# Patient Record
Sex: FEMALE | Race: WHITE | NOT HISPANIC OR LATINO | Employment: UNEMPLOYED | URBAN - METROPOLITAN AREA
[De-identification: names, ages, dates, MRNs, and addresses within clinical notes are randomized per-mention and may not be internally consistent; named-entity substitution may affect disease eponyms.]

---

## 2017-03-09 ENCOUNTER — HISTORICAL (OUTPATIENT)
Dept: FAMILY MEDICINE | Facility: CLINIC | Age: 67
End: 2017-03-09

## 2017-07-10 ENCOUNTER — HISTORICAL (OUTPATIENT)
Dept: FAMILY MEDICINE | Facility: CLINIC | Age: 67
End: 2017-07-10

## 2017-08-18 ENCOUNTER — HISTORICAL (OUTPATIENT)
Dept: FAMILY MEDICINE | Facility: CLINIC | Age: 67
End: 2017-08-18

## 2018-04-18 ENCOUNTER — HISTORICAL (OUTPATIENT)
Dept: ADMINISTRATIVE | Facility: HOSPITAL | Age: 68
End: 2018-04-18

## 2018-04-18 ENCOUNTER — HISTORICAL (OUTPATIENT)
Dept: FAMILY MEDICINE | Facility: CLINIC | Age: 68
End: 2018-04-18

## 2018-04-18 LAB
ABS NEUT (OLG): 3.39 X10(3)/MCL (ref 2.1–9.2)
ALBUMIN SERPL-MCNC: 4.4 GM/DL (ref 3.4–5)
ALBUMIN/GLOB SERPL: 1 RATIO (ref 1–2)
ALP SERPL-CCNC: 62 UNIT/L (ref 45–117)
ALT SERPL-CCNC: 20 UNIT/L (ref 12–78)
AST SERPL-CCNC: 14 UNIT/L (ref 15–37)
BASOPHILS # BLD AUTO: 0.04 X10(3)/MCL
BASOPHILS NFR BLD AUTO: 1 %
BILIRUB SERPL-MCNC: 0.5 MG/DL (ref 0.2–1)
BILIRUBIN DIRECT+TOT PNL SERPL-MCNC: 0.1 MG/DL
BILIRUBIN DIRECT+TOT PNL SERPL-MCNC: 0.4 MG/DL
BUN SERPL-MCNC: 27 MG/DL (ref 7–18)
CALCIUM SERPL-MCNC: 8.6 MG/DL (ref 8.5–10.1)
CHLORIDE SERPL-SCNC: 105 MMOL/L (ref 98–107)
CHOLEST SERPL-MCNC: 192 MG/DL
CHOLEST/HDLC SERPL: 4.1 {RATIO} (ref 0–4.4)
CO2 SERPL-SCNC: 25 MMOL/L (ref 21–32)
CREAT SERPL-MCNC: 0.9 MG/DL (ref 0.6–1.3)
CREAT UR-MCNC: 66 MG/DL
DEPRECATED CALCIDIOL+CALCIFEROL SERPL-MC: 43.03 NG/ML (ref 30–80)
EOSINOPHIL # BLD AUTO: 0.18 X10(3)/MCL
EOSINOPHIL NFR BLD AUTO: 4 %
ERYTHROCYTE [DISTWIDTH] IN BLOOD BY AUTOMATED COUNT: 12.5 % (ref 11.5–14.5)
EST. AVERAGE GLUCOSE BLD GHB EST-MCNC: 183 MG/DL
GLOBULIN SER-MCNC: 3.1 GM/ML (ref 2.3–3.5)
GLUCOSE SERPL-MCNC: 135 MG/DL (ref 74–106)
HAV IGM SERPL QL IA: NONREACTIVE
HBA1C MFR BLD: 8 % (ref 4.2–6.3)
HBV CORE IGM SERPL QL IA: NONREACTIVE
HBV SURFACE AG SERPL QL IA: NEGATIVE
HCT VFR BLD AUTO: 37.8 % (ref 35–46)
HCV AB SERPL QL IA: REACTIVE
HDLC SERPL-MCNC: 47 MG/DL
HGB BLD-MCNC: 13 GM/DL (ref 12–16)
IMM GRANULOCYTES # BLD AUTO: 0.03 10*3/UL
IMM GRANULOCYTES NFR BLD AUTO: 1 %
LDLC SERPL CALC-MCNC: 127 MG/DL (ref 0–130)
LYMPHOCYTES # BLD AUTO: 1.29 X10(3)/MCL
LYMPHOCYTES NFR BLD AUTO: 25 % (ref 13–40)
MCH RBC QN AUTO: 31.8 PG (ref 26–34)
MCHC RBC AUTO-ENTMCNC: 34.4 GM/DL (ref 31–37)
MCV RBC AUTO: 92.4 FL (ref 80–100)
MICROALBUMIN UR-MCNC: <5 MG/L (ref 0–19)
MICROALBUMIN/CREAT RATIO PNL UR: <7.6 MCG/MG CR (ref 0–29)
MONOCYTES # BLD AUTO: 0.26 X10(3)/MCL
MONOCYTES NFR BLD AUTO: 5 % (ref 4–12)
NEUTROPHILS # BLD AUTO: 3.39 X10(3)/MCL
NEUTROPHILS NFR BLD AUTO: 65 X10(3)/MCL
PLATELET # BLD AUTO: 149 X10(3)/MCL (ref 130–400)
PMV BLD AUTO: 9.5 FL (ref 7.4–10.4)
POTASSIUM SERPL-SCNC: 4.6 MMOL/L (ref 3.5–5.1)
PROT SERPL-MCNC: 7.5 GM/DL (ref 6.4–8.2)
RBC # BLD AUTO: 4.09 X10(6)/MCL (ref 4–5.2)
SODIUM SERPL-SCNC: 138 MMOL/L (ref 136–145)
T4 SERPL-MCNC: 8.7 MCG/DL (ref 4.8–13.9)
TRIGL SERPL-MCNC: 88 MG/DL
TSH SERPL-ACNC: 1.48 MIU/L (ref 0.36–3.74)
VIT B12 SERPL-MCNC: 366 PG/ML (ref 193–986)
VLDLC SERPL CALC-MCNC: 18 MG/DL
WBC # SPEC AUTO: 5.2 X10(3)/MCL (ref 4.5–11)

## 2018-11-15 ENCOUNTER — HISTORICAL (OUTPATIENT)
Dept: ADMINISTRATIVE | Facility: HOSPITAL | Age: 68
End: 2018-11-15

## 2018-11-15 LAB
EST. AVERAGE GLUCOSE BLD GHB EST-MCNC: 151 MG/DL
HBA1C MFR BLD: 6.9 % (ref 4.2–6.3)

## 2019-01-11 ENCOUNTER — HISTORICAL (OUTPATIENT)
Dept: ADMINISTRATIVE | Facility: HOSPITAL | Age: 69
End: 2019-01-11

## 2019-01-11 LAB
ABS NEUT (OLG): 6.63 X10(3)/MCL (ref 2.1–9.2)
ALBUMIN SERPL-MCNC: 3.9 GM/DL (ref 3.4–5)
ALBUMIN/GLOB SERPL: 1 RATIO (ref 1–2)
ALP SERPL-CCNC: 94 UNIT/L (ref 45–117)
ALT SERPL-CCNC: 37 UNIT/L (ref 12–78)
AST SERPL-CCNC: 18 UNIT/L (ref 15–37)
BASOPHILS # BLD AUTO: 0.1 X10(3)/MCL
BASOPHILS NFR BLD AUTO: 1 %
BILIRUB SERPL-MCNC: 0.5 MG/DL (ref 0.2–1)
BILIRUBIN DIRECT+TOT PNL SERPL-MCNC: 0.2 MG/DL
BILIRUBIN DIRECT+TOT PNL SERPL-MCNC: 0.3 MG/DL
BUN SERPL-MCNC: 24 MG/DL (ref 7–18)
CALCIUM SERPL-MCNC: 9.4 MG/DL (ref 8.5–10.1)
CHLORIDE SERPL-SCNC: 93 MMOL/L (ref 98–107)
CO2 SERPL-SCNC: 26 MMOL/L (ref 21–32)
CREAT SERPL-MCNC: 1 MG/DL (ref 0.6–1.3)
EOSINOPHIL # BLD AUTO: 0.11 10*3/UL
EOSINOPHIL NFR BLD AUTO: 1 %
ERYTHROCYTE [DISTWIDTH] IN BLOOD BY AUTOMATED COUNT: 12 % (ref 11.5–14.5)
GLOBULIN SER-MCNC: 4.6 GM/ML (ref 2.3–3.5)
GLUCOSE SERPL-MCNC: 361 MG/DL (ref 74–106)
HCT VFR BLD AUTO: 41.5 % (ref 35–46)
HGB BLD-MCNC: 14 GM/DL (ref 12–16)
IMM GRANULOCYTES # BLD AUTO: 0.37 10*3/UL
IMM GRANULOCYTES NFR BLD AUTO: 4 %
LYMPHOCYTES # BLD AUTO: 1.08 X10(3)/MCL
LYMPHOCYTES NFR BLD AUTO: 12 % (ref 13–40)
MCH RBC QN AUTO: 31.3 PG (ref 26–34)
MCHC RBC AUTO-ENTMCNC: 33.7 GM/DL (ref 31–37)
MCV RBC AUTO: 92.8 FL (ref 80–100)
MONOCYTES # BLD AUTO: 0.63 X10(3)/MCL
MONOCYTES NFR BLD AUTO: 7 % (ref 4–12)
NEUTROPHILS # BLD AUTO: 6.63 X10(3)/MCL
NEUTROPHILS NFR BLD AUTO: 74 X10(3)/MCL
PLATELET # BLD AUTO: 249 X10(3)/MCL (ref 130–400)
PMV BLD AUTO: 9.5 FL (ref 7.4–10.4)
POTASSIUM SERPL-SCNC: 4.6 MMOL/L (ref 3.5–5.1)
PROT SERPL-MCNC: 8.5 GM/DL (ref 6.4–8.2)
RBC # BLD AUTO: 4.47 X10(6)/MCL (ref 4–5.2)
SODIUM SERPL-SCNC: 130 MMOL/L (ref 136–145)
WBC # SPEC AUTO: 8.9 X10(3)/MCL (ref 4.5–11)

## 2019-05-01 ENCOUNTER — HISTORICAL (OUTPATIENT)
Dept: ADMINISTRATIVE | Facility: HOSPITAL | Age: 69
End: 2019-05-01

## 2019-05-01 LAB
CHOLEST SERPL-MCNC: 187 MG/DL
CHOLEST/HDLC SERPL: 3.5 {RATIO} (ref 0–4.4)
CREAT UR-MCNC: 37 MG/DL
EST. AVERAGE GLUCOSE BLD GHB EST-MCNC: 177 MG/DL
HBA1C MFR BLD: 7.8 % (ref 4.2–6.3)
HDLC SERPL-MCNC: 53 MG/DL
LDLC SERPL CALC-MCNC: 115 MG/DL (ref 0–130)
MICROALBUMIN UR-MCNC: <5 MG/L (ref 0–19)
MICROALBUMIN/CREAT RATIO PNL UR: <13.5 MCG/MG CR (ref 0–29)
T4 FREE SERPL-MCNC: 0.92 NG/DL (ref 0.76–1.46)
TRIGL SERPL-MCNC: 93 MG/DL
TSH SERPL-ACNC: 2.74 MIU/L (ref 0.36–3.74)
VIT B12 SERPL-MCNC: 465 PG/ML (ref 193–986)
VLDLC SERPL CALC-MCNC: 19 MG/DL

## 2019-07-16 ENCOUNTER — HISTORICAL (OUTPATIENT)
Dept: RADIOLOGY | Facility: HOSPITAL | Age: 69
End: 2019-07-16

## 2020-01-21 ENCOUNTER — HISTORICAL (OUTPATIENT)
Dept: FAMILY MEDICINE | Facility: CLINIC | Age: 70
End: 2020-01-21

## 2020-03-06 ENCOUNTER — HISTORICAL (OUTPATIENT)
Dept: ADMINISTRATIVE | Facility: HOSPITAL | Age: 70
End: 2020-03-06

## 2020-03-06 LAB
BUN SERPL-MCNC: 13 MG/DL (ref 7–18)
CALCIUM SERPL-MCNC: 8.7 MG/DL (ref 8.5–10.1)
CHLORIDE SERPL-SCNC: 100 MMOL/L (ref 98–107)
CO2 SERPL-SCNC: 25 MMOL/L (ref 21–32)
CREAT SERPL-MCNC: 0.6 MG/DL (ref 0.6–1.3)
CREAT/UREA NIT SERPL: 22
EST. AVERAGE GLUCOSE BLD GHB EST-MCNC: 220 MG/DL
GLUCOSE SERPL-MCNC: 247 MG/DL (ref 74–106)
HBA1C MFR BLD: 9.3 % (ref 4.2–6.3)
POTASSIUM SERPL-SCNC: 3.7 MMOL/L (ref 3.5–5.1)
SODIUM SERPL-SCNC: 132 MMOL/L (ref 136–145)
VIT B12 SERPL-MCNC: 485 PG/ML (ref 193–986)

## 2020-06-15 ENCOUNTER — HISTORICAL (OUTPATIENT)
Dept: RADIOLOGY | Facility: HOSPITAL | Age: 70
End: 2020-06-15

## 2020-09-23 ENCOUNTER — HISTORICAL (OUTPATIENT)
Dept: CARDIOLOGY | Facility: HOSPITAL | Age: 70
End: 2020-09-23

## 2020-10-02 ENCOUNTER — HISTORICAL (OUTPATIENT)
Dept: RADIOLOGY | Facility: HOSPITAL | Age: 70
End: 2020-10-02

## 2020-10-27 ENCOUNTER — HISTORICAL (OUTPATIENT)
Dept: ADMINISTRATIVE | Facility: HOSPITAL | Age: 70
End: 2020-10-27

## 2020-10-27 LAB
BUN SERPL-MCNC: 18 MG/DL (ref 9.8–20.1)
CALCIUM SERPL-MCNC: 9.2 MG/DL (ref 8.4–10.2)
CHLORIDE SERPL-SCNC: 102 MMOL/L (ref 98–107)
CHOLEST SERPL-MCNC: 171 MG/DL
CHOLEST/HDLC SERPL: 3 {RATIO} (ref 0–5)
CO2 SERPL-SCNC: 25 MMOL/L (ref 23–31)
CREAT SERPL-MCNC: 0.84 MG/DL (ref 0.55–1.02)
CREAT/UREA NIT SERPL: 21
DEPRECATED CALCIDIOL+CALCIFEROL SERPL-MC: 19.6 NG/ML (ref 30–80)
EST. AVERAGE GLUCOSE BLD GHB EST-MCNC: 157.1 MG/DL
GLUCOSE SERPL-MCNC: 201 MG/DL (ref 82–115)
HBA1C MFR BLD: 7.1 %
HDLC SERPL-MCNC: 52 MG/DL (ref 35–60)
LDLC SERPL CALC-MCNC: 96 MG/DL (ref 50–140)
POTASSIUM SERPL-SCNC: 3.9 MMOL/L (ref 3.5–5.1)
SODIUM SERPL-SCNC: 136 MMOL/L (ref 136–145)
TRIGL SERPL-MCNC: 115 MG/DL (ref 37–140)
VLDLC SERPL CALC-MCNC: 23 MG/DL

## 2020-12-14 ENCOUNTER — HISTORICAL (OUTPATIENT)
Dept: ADMINISTRATIVE | Facility: HOSPITAL | Age: 70
End: 2020-12-14

## 2020-12-14 LAB
ABS NEUT (OLG): 3.42 X10(3)/MCL (ref 2.1–9.2)
BASOPHILS # BLD AUTO: 0 X10(3)/MCL (ref 0–0.2)
BASOPHILS NFR BLD AUTO: 1 %
EOSINOPHIL # BLD AUTO: 0.2 X10(3)/MCL (ref 0–0.9)
EOSINOPHIL NFR BLD AUTO: 3 %
ERYTHROCYTE [DISTWIDTH] IN BLOOD BY AUTOMATED COUNT: 17.2 % (ref 11.5–14.5)
HCT VFR BLD AUTO: 37.2 % (ref 35–46)
HGB BLD-MCNC: 11.7 GM/DL (ref 12–16)
IMM GRANULOCYTES # BLD AUTO: 0.01 10*3/UL
IMM GRANULOCYTES NFR BLD AUTO: 0 %
LYMPHOCYTES # BLD AUTO: 1 X10(3)/MCL (ref 0.6–4.6)
LYMPHOCYTES NFR BLD AUTO: 20 %
MCH RBC QN AUTO: 27.3 PG (ref 26–34)
MCHC RBC AUTO-ENTMCNC: 31.5 GM/DL (ref 31–37)
MCV RBC AUTO: 86.9 FL (ref 80–100)
MONOCYTES # BLD AUTO: 0.3 X10(3)/MCL (ref 0.1–1.3)
MONOCYTES NFR BLD AUTO: 6 %
NEUTROPHILS # BLD AUTO: 3.42 X10(3)/MCL (ref 2.1–9.2)
NEUTROPHILS NFR BLD AUTO: 69 %
PLATELET # BLD AUTO: 201 X10(3)/MCL (ref 130–400)
PMV BLD AUTO: 9.6 FL (ref 7.4–10.4)
RBC # BLD AUTO: 4.28 X10(6)/MCL (ref 4–5.2)
T3FREE SERPL-MCNC: 2.49 PG/ML (ref 1.71–3.71)
T4 FREE SERPL-MCNC: 1.1 NG/DL (ref 0.7–1.48)
TSH SERPL-ACNC: 1.03 UIU/ML (ref 0.35–4.94)
WBC # SPEC AUTO: 4.9 X10(3)/MCL (ref 4.5–11)

## 2021-01-22 ENCOUNTER — HISTORICAL (OUTPATIENT)
Dept: ADMINISTRATIVE | Facility: HOSPITAL | Age: 71
End: 2021-01-22

## 2021-01-22 LAB — SARS-COV-2 RNA RESP QL NAA+PROBE: NOT DETECTED

## 2021-05-02 LAB
LEFT EYE DM RETINOPATHY: NEGATIVE
RIGHT EYE DM RETINOPATHY: NEGATIVE

## 2021-06-29 ENCOUNTER — HISTORICAL (OUTPATIENT)
Dept: ADMINISTRATIVE | Facility: HOSPITAL | Age: 71
End: 2021-06-29

## 2021-06-29 LAB
CREAT UR-MCNC: 147.9 MG/DL (ref 45–106)
CREAT UR-MCNC: 147.9 MG/DL (ref 45–106)
EST. AVERAGE GLUCOSE BLD GHB EST-MCNC: 108.3 MG/DL
HBA1C MFR BLD: 5.4 %
MICROALBUMIN UR-MCNC: 5.3 MG/L
MICROALBUMIN/CREAT RATIO PNL UR: 3.6 MG/GM CR (ref 0–30)
PROT UR STRIP-MCNC: 9.1 MG/DL
PROT/CREAT UR-RTO: 61.5 MG/GM CR
T4 FREE SERPL-MCNC: 1.04 NG/DL (ref 0.7–1.48)
TSH SERPL-ACNC: 0.21 UIU/ML (ref 0.35–4.94)

## 2021-12-16 ENCOUNTER — HISTORICAL (OUTPATIENT)
Dept: ADMINISTRATIVE | Facility: HOSPITAL | Age: 71
End: 2021-12-16

## 2021-12-16 LAB
ABS NEUT (OLG): 4.45 X10(3)/MCL (ref 2.1–9.2)
ALBUMIN SERPL-MCNC: 4.4 GM/DL (ref 3.4–4.8)
ALBUMIN/GLOB SERPL: 1.5 RATIO (ref 1.1–2)
ALP SERPL-CCNC: 73 UNIT/L (ref 40–150)
ALT SERPL-CCNC: 13 UNIT/L (ref 0–55)
AST SERPL-CCNC: 16 UNIT/L (ref 5–34)
BASOPHILS # BLD AUTO: 0 X10(3)/MCL (ref 0–0.2)
BASOPHILS NFR BLD AUTO: 1 %
BILIRUB SERPL-MCNC: 0.3 MG/DL
BILIRUBIN DIRECT+TOT PNL SERPL-MCNC: 0.1 MG/DL (ref 0–0.8)
BILIRUBIN DIRECT+TOT PNL SERPL-MCNC: 0.2 MG/DL (ref 0–0.5)
BUN SERPL-MCNC: 18 MG/DL (ref 9.8–20.1)
CALCIUM SERPL-MCNC: 9.8 MG/DL (ref 8.7–10.5)
CHLORIDE SERPL-SCNC: 106 MMOL/L (ref 98–107)
CO2 SERPL-SCNC: 19 MMOL/L (ref 23–31)
CREAT SERPL-MCNC: 0.8 MG/DL (ref 0.55–1.02)
DEPRECATED CALCIDIOL+CALCIFEROL SERPL-MC: 40.4 NG/ML (ref 30–80)
EOSINOPHIL # BLD AUTO: 0.1 X10(3)/MCL (ref 0–0.9)
EOSINOPHIL NFR BLD AUTO: 1 %
ERYTHROCYTE [DISTWIDTH] IN BLOOD BY AUTOMATED COUNT: 15.4 % (ref 11.5–14.5)
EST. AVERAGE GLUCOSE BLD GHB EST-MCNC: 168.6 MG/DL
GLOBULIN SER-MCNC: 2.9 GM/DL (ref 2.4–3.5)
GLUCOSE SERPL-MCNC: 148 MG/DL (ref 82–115)
HBA1C MFR BLD: 7.5 %
HCT VFR BLD AUTO: 34.9 % (ref 35–46)
HGB BLD-MCNC: 11 GM/DL (ref 12–16)
IMM GRANULOCYTES # BLD AUTO: 0.02 10*3/UL
IMM GRANULOCYTES NFR BLD AUTO: 0 %
LYMPHOCYTES # BLD AUTO: 0.8 X10(3)/MCL (ref 0.6–4.6)
LYMPHOCYTES NFR BLD AUTO: 15 %
MCH RBC QN AUTO: 27 PG (ref 26–34)
MCHC RBC AUTO-ENTMCNC: 31.5 GM/DL (ref 31–37)
MCV RBC AUTO: 85.7 FL (ref 80–100)
MONOCYTES # BLD AUTO: 0.3 X10(3)/MCL (ref 0.1–1.3)
MONOCYTES NFR BLD AUTO: 5 %
NEUTROPHILS # BLD AUTO: 4.45 X10(3)/MCL (ref 2.1–9.2)
NEUTROPHILS NFR BLD AUTO: 78 %
NRBC BLD AUTO-RTO: 0 % (ref 0–0.2)
PLATELET # BLD AUTO: 177 X10(3)/MCL (ref 130–400)
PMV BLD AUTO: 10.3 FL (ref 7.4–10.4)
POTASSIUM SERPL-SCNC: 3.9 MMOL/L (ref 3.5–5.1)
PROT SERPL-MCNC: 7.3 GM/DL (ref 5.8–7.6)
RBC # BLD AUTO: 4.07 X10(6)/MCL (ref 4–5.2)
SODIUM SERPL-SCNC: 139 MMOL/L (ref 136–145)
T4 FREE SERPL-MCNC: 1.22 NG/DL (ref 0.7–1.48)
TSH SERPL-ACNC: 0.68 UIU/ML (ref 0.35–4.94)
VIT B12 SERPL-MCNC: 494 PG/ML (ref 213–816)
WBC # SPEC AUTO: 5.7 X10(3)/MCL (ref 4.5–11)

## 2022-02-04 ENCOUNTER — HISTORICAL (OUTPATIENT)
Dept: RADIOLOGY | Facility: HOSPITAL | Age: 72
End: 2022-02-04

## 2022-02-04 ENCOUNTER — HISTORICAL (OUTPATIENT)
Dept: ADMINISTRATIVE | Facility: HOSPITAL | Age: 72
End: 2022-02-04

## 2022-04-10 ENCOUNTER — HISTORICAL (OUTPATIENT)
Dept: ADMINISTRATIVE | Facility: HOSPITAL | Age: 72
End: 2022-04-10
Payer: COMMERCIAL

## 2022-04-11 ENCOUNTER — HISTORICAL (OUTPATIENT)
Dept: ADMINISTRATIVE | Facility: HOSPITAL | Age: 72
End: 2022-04-11
Payer: COMMERCIAL

## 2022-04-27 ENCOUNTER — HISTORICAL (OUTPATIENT)
Dept: ADMINISTRATIVE | Facility: HOSPITAL | Age: 72
End: 2022-04-27
Payer: COMMERCIAL

## 2022-04-28 VITALS
DIASTOLIC BLOOD PRESSURE: 75 MMHG | SYSTOLIC BLOOD PRESSURE: 110 MMHG | WEIGHT: 129.19 LBS | OXYGEN SATURATION: 97 % | HEIGHT: 66 IN | BODY MASS INDEX: 20.76 KG/M2

## 2022-04-28 VITALS
BODY MASS INDEX: 20.76 KG/M2 | SYSTOLIC BLOOD PRESSURE: 110 MMHG | DIASTOLIC BLOOD PRESSURE: 75 MMHG | WEIGHT: 129.19 LBS | OXYGEN SATURATION: 97 % | HEIGHT: 66 IN

## 2022-04-28 DIAGNOSIS — G20.C PARKINSONISM, UNSPECIFIED PARKINSONISM TYPE: Primary | ICD-10-CM

## 2022-05-03 NOTE — HISTORICAL [facility] CERNER
This is a historical note converted from [employer]. Formatting and pictures may have been removed.  Please reference [employer] for original formatting and attached multimedia. Chief Complaint  HERE FOR GERIATRIC ASSESSMENT  PT HAD FALL 1 WEEK AGO C/O PAIN TO LEFT RIB CAGE AREA  C/O WEAKNESS AND UNSTEADY GAIT  History of Present Illness  Ms. Brenda Hernández is a 71 year old female with a past medical history of type 2 diabetes mellitus, hypothyroidism, hyperlipidemia, and anxiety/depression.? She presents to geriatrics assessment clinic for initial visit.? She was referred by her PCP Dr. Hardy for memory loss, depression, and functional decline.? Per nursing report, she is very unsteady on her feet and had a fall just last week.? She expresses sadness and understands that her situation is not ideal, is requesting assistance 2-3 times a week with bathing, food preparation, and cleaning.?  ?  Geriatrics Assessment  Functional Capacity:  BADLs: difficulty with bathing. No shower chair.? incontinent, wears diapers  IADLs: goes shopping with friends, does not drive, as far as preparing meals, she eats mostly microwavable foods or junk foods, is waiting for meals on wheels  Fall Risk: has fallen 4 times in the past 6 weeks, loses her footing.? Unsteady gait, no visual or hearing loss.? Lives on the second floor of her subsidized housing  Cognition: intact, SLUMS of 27  Mood: depressed  Polypharmacy: takes ativan 1.5mg TID, ingrezza 40mg daily, seroquel 200mg at night for insomnia, has a history of alcohol abuse and marijuana use  Social support: poor, no family in the area, has some friends that help her around that are older than her, in their 80s  Financial concerns: indigent  Goals of care: wants assistance at home, has a cat that she is adamant about not abandoning, limiting her placement in an assisted care living facility  Advance care preferences: is the oldest of 3 siblings, has a younger brother and sister  Code status:  DNR/DNI  Review of Systems  12 point ROS negative unless otherwise stated above  Physical Exam  Vitals & Measurements  T:?37.0? ?C (Oral)? HR:?96(Peripheral)? RR:?20? BP:?127/78?  ?  Gen: She is alert and oriented x3. Well developed, well-nourished, frail appearing elderly  female  Head: Normocephalic, atraumatic, black eye on R side secondary to fall  Eyes: PERRL, EOMI, no conjunctival injection or pallor, periorbital ecchymosis of R eye  Nose: No nasal discharge  Throat: No pharyngeal inflammation, erythema, discharge, mucous membranes moist  Neck: Supple. No carotid bruits.? No lymphadenopathy or thyromegaly.  Lungs: Clear to auscultation bilaterally  CV: Normal S1 & S2, RRR, no murmurs appreciated  Abdomen: Soft, NT/ND, bowel sounds present.? No hepatosplenomegaly  Ext: No cyanosis/clubbing/edema, pulses 2+  Neuro:?CN II-XII grossly intact, generalized weakness, unsteady gait, resting tremor of bilateral upper extremities more prominent on L side  Psych: Flat affect, expresses depressive thoughts but denies SI/HI  Skin: No rashes, lesions, ulceration or induration  Assessment/Plan  Geriatric Assessment  Will defer to Dr. Hardy for management of Primary care services  Rescheduled appointment from 1/4 to 1/13 due to multiple appointments in a day  management of tardive dyskinesia per Dr. Alexander  Would recommend deprescribing of lorazepam, oxybutynin, ingrezza, and seroquel if patient is agreeable  Will repeat labs today, CBC, CMP, TSH, FT4, A1c, vitamin D, vitamin B12  Patient would benefit from round the clock care in the setting of an acute care facility but has complex social situation  ?   RTC in [facility]  ?   ????I saw and evaluated the patient. I discussed with the resident and agree with the residents findings and plan as documented in the residents note.  ?   Ant Macias MD  internal medicine [id number]   Problem List/Past Medical History  Ongoing  Bipolar  disorder  Depression  Fibromyalgia  Frailty  NEWTON (generalized anxiety disorder)  GERD without esophagitis  h/o Hep C, took tx, was cured  Hypercholesterolemia  Hypertension  Hypothyroidism  Migraines  Neuroleptic induced parkinsonism  Neuroleptic-induced tardive dyskinesia  Neuropathy  Restless legs syndrome  Tardive dyskinesia  Tremor  Type 2 diabetes mellitus  Urinary incontinence  Historical  Coughing  left dequervains  Wears glasses  Procedure/Surgical History  Dequervains (Left) (05/23/2014)  Exploration of tendon sheath of hand (05/23/2014)  Incision, extensor tendon sheath, wrist (eg, deQuervains disease). (05/23/2014)  Biopsy of liver (1990)  Tonsillectomy (1956)  removal IUD   Medications  chlorhexidine 0.12% mucous membrane liquid, 15 mL, Oral, BID  citalopram 10 mg oral tablet, 10 mg= 1 tab(s), Oral, Daily  Glucometer, See Instructions  Glucometer Lancets & Testing Strips, See Instructions, 3 refills  Ingrezza 40 mg oral capsule, 40 mg= 1 cap(s), Oral, Daily, 4 refills  levothyroxine 50 mcg (0.05 mg) oral tablet, 0.05 mg= 1 tab(s), Oral, Daily-Resp, 5 refills  lisinopril 2.5 mg oral tablet, 2.5 mg= 1 tab(s), Oral, Daily, 1 refills  LORazepam 0.5 mg oral tablet, 0.5 mg= 1 tab(s), Oral, TID, PRN  LORazepam 1 mg oral tablet, 1 mg= 1 tab(s), Oral, TID  metFORMIN 1000 mg oral tablet, See Instructions, 5 refills,? ?Not taking  oxybutynin 5 mg oral tablet, 5 mg= 1 tab(s), Oral, TID, 5 refills  pair of diabetic shoes and 3 pairs of insoles, See Instructions  Pantoprazole 40 mg ORAL EC-Tablet, 40 mg= 1 tab(s), Oral, Daily, 3 refills  Pressure Relieving Seat Cushion, See Instructions  QUEtiapine 200 mg oral tablet, 200 mg= 1 tab(s), Oral, qPM,? ?Not taking  Shingrix, 0.5 mL, IM, Once-NOW  simvastatin 20 mg oral tablet, 20 mg= 1 tab(s), Oral, Once a day (at bedtime), 3 refills  Sure Comfort Pen Needles, See Instructions, 11 refills  tiZANidine 4 mg oral tablet, 4 mg= 1 tab(s), Oral, q8hr Resp  Verio testing strips,  See Instructions, 11 refills  Allergies  Benadryl  Vistaril  amitriptyline  aspirin?(cannot take d/t previous interferon treatment)  dextromethorphan  Social History  Abuse/Neglect  No, No, Yes, 07/08/2021  No, No, Yes, 06/02/2021  No, No, Yes, 04/30/2021  No, No, Yes, 04/06/2021  No, No, Yes, 10/27/2020  Alcohol  Past, 12/14/2020  Employment/School  Retired, 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  Depression.: Sister.  Diabetes mellitus type 2: Sister and Brother.  Heart disease: Grandfather.  Hypertension.: Sister and Brother.  Stroke: Grandfather.  Immunizations  Vaccine Date Status   zoster vaccine, inactivated 08/02/2021 Given   pneumococcal 23-polyvalent vaccine 04/28/2021 Given   zoster vaccine, inactivated 04/28/2021 Given   COVID-19 MRNA, LNP-S, PF- Pfizer 03/18/2021 Given   COVID-19 MRNA, LNP-S, PF- Pfizer 02/25/2021 Given   influenza virus vaccine, inactivated 10/27/2020 Given   influenza virus vaccine, inactivated 01/17/2020 Given   influenza virus vaccine, inactivated 11/15/2018 Given   influenza virus vaccine, inactivated 01/30/2018 Given   pneumococcal 13-valent conjugate vaccine 04/17/2017 Given   tetanus/diphtheria/pertussis, acel(Tdap) 02/20/2017 Given   Health Maintenance  Health Maintenance  ???Pending?(in the next year)  ??? ??OverDue  ??? ? ? ?Advance Directive due??01/02/21??and every 1??year(s)  ??? ? ? ?Colorectal Screening due??01/18/21??and every 1??year(s)  ??? ? ? ?Bone Density Screening due??07/16/21??and every 2??year(s)  ??? ? ? ?Diabetes Maintenance-Fasting Lipid Profile due??10/27/21??and  every 1??year(s)  ??? ??Due?  ??? ? ? ?Medicare Annual Wellness Exam due??12/16/21??and every 1??year(s)  ??? ??Due In Future?  ??? ? ? ?Obesity Screening not due until??01/01/22??and every 1??year(s)  ??? ? ? ?Alcohol Misuse Screening not due until??01/02/22??and every 1??year(s)  ??? ? ? ?Cognitive Screening not due until??01/02/22??and every 1??year(s)  ??? ? ? ?Fall Risk Assessment not due until??01/02/22??and every 1??year(s)  ??? ? ? ?Functional Assessment not due until??01/02/22??and every 1??year(s)  ??? ? ? ?Diabetes Maintenance-Eye Exam not due until??04/28/22??and every 1??year(s)  ??? ? ? ?Hypertension Management-BMP not due until??04/30/22??and every 1??year(s)  ??? ? ? ?Diabetes Maintenance-Serum Creatinine not due until??04/30/22??and every 1??year(s)  ??? ? ? ?Hypertension Management-Education not due until??06/02/22??and every 1??year(s)  ??? ? ? ?Diabetes Maintenance-Foot Exam not due until??06/29/22??and every 1??year(s)  ??? ? ? ?ADL Screening not due until??07/08/22??and every 1??year(s)  ??? ? ? ?Breast Cancer Screening not due until??10/02/22??and every 2??year(s)  ??? ? ? ?Blood Pressure Screening not due until??10/28/22??and every 1??year(s)  ??? ? ? ?Body Mass Index Check not due until??10/28/22??and every 1??year(s)  ??? ? ? ?Diabetes Maintenance-HgbA1c not due until??10/28/22??and every 1??year(s)  ??? ? ? ?Hypertension Management-Blood Pressure not due until??10/28/22??and every 1??year(s)  ???Satisfied?(in the past 1 year)  ??? ??Satisfied?  ??? ? ? ?ADL Screening on??07/08/21.??Satisfied by Raquel Christina LPN  ??? ? ? ?Alcohol Misuse Screening on??04/28/21.??Satisfied by Katt Pizarro LPN  ??? ? ? ?Blood Pressure Screening on??12/16/21.??Satisfied by Eugenie Jimenez LPN  ??? ? ? ?Body Mass Index Check on??10/28/21.??Satisfied by Katt Pizarro LPN  ??? ? ? ?Cognitive Screening on??12/16/21.??Satisfied by Eugenie Jimenez LPN  ??? ? ? ?Depression Screening on??12/16/21.??Satisfied by  Eugenie Jimenez LPN  ??? ? ? ?Diabetes Maintenance-HgbA1c on??10/28/21.??Satisfied by Katt Pizarro LPN  ??? ? ? ?Diabetes Screening on??06/29/21.??Satisfied by STORMY SPANN  ??? ? ? ?Fall Risk Assessment on??12/16/21.??Satisfied by Eugenie Jimenez LPN  ??? ? ? ?Functional Assessment on??12/16/21.??Satisfied by Euegnie Jimenez LPN  ??? ? ? ?Hypertension Management-Blood Pressure on??12/16/21.??Satisfied by Eugenie Jimenez LPN  ??? ? ? ?Obesity Screening on??10/28/21.??Satisfied by Katt Pizarro LPN  ??? ? ? ?Pneumococcal Vaccine on??04/28/21.??Satisfied by Katt Pizarro LPN  ??? ? ? ?Zoster Vaccine on??08/02/21.??Satisfied by Real Dowell LPN  ?

## 2022-05-03 NOTE — HISTORICAL [facility] CERNER
This is a historical note converted from Ac. Formatting and pictures may have been removed.  Please reference Ac for original formatting and attached multimedia. Chief Complaint  f/u 3 month , htn / dm , , need meds refill .  History of Present Illness  68 yo WF  ?  Bipolar?depression-?On 2/18/2020 presented to [facility] ER via EMS.? She owes her marijuana dealer $200 and was paranoid that he was trying to break into her house.? ER physician did believe that she could have a drug dealer banging on her door but given her inconsistencies in her story and her psychiatric history she was PECd and sent to a psychiatric facility in [place].? She had a 5-day stay in the psychiatric hospital and was very displeased with her treatment there.? They weaned her off her chronic clonazepam.? Placed on Ambien to replace the clonazepam to sleep.? She saw psychiatrist Dr. Terrie Alexander 3/5/20 for psychiatric follow-up.? Changes to psychiatric medicine clonazepam and Latuda have been discontinued.? Ambien was discontinued on 3/5/2020 and Sertraline 100 mg was continued.? Geodon 40 mg twice daily was added.? Current psychiatric regimen sertraline 100 mg daily, ziprasidone 40 mg twice daily.? She sees counselor [facility] at [facility].? She suffers from akathisia?given her chronic?antipsychotic use.  ?  Chronic lumbar back pain-bilateral radicular?symptoms.? Referral?to?Dr. Zaman for LESI placed in 1/17/20 but given?psychiatric episode she has not?arranged?appointment. ?She did obtain back brace after last visit and reports significant improvement in pain.? She is still taking gabapentin 800 mg?3 times daily for the radicular pain.  ?  DM-Metformin 1000 mg twice daily?A1c 7.8 5/2019  ?  Hypothyroidism-TSH 2.93 on 2/18/2020, currently on levothyroxine?50 mcg daily.  ?  Hypokalemia?- potassium 3.1 on 2/18/2020 in ER.? Has been taking potassium since psychiatric discharge.? Denies muscle cramps  Review of  Systems  Constitutional:?Reports weight loss given recent psychiatric illness. 13 pound 2-month weight loss.? When patient was told she is 143 pounds she reported that?this was?a good weight for her  GI: No constipation or diarrhea  Psychiatric: See HPI?  Neurological:?Reports?bilateral tremors?chronically  Physical Exam  Vitals & Measurements  T:?37.5? ?C (Oral)? HR:?91(Peripheral)? RR:?20? BP:?130/79? SpO2:?100%?  HT:?167?cm? WT:?65.10?kg? BMI:?23.34?  General: no acute distress, thin  Eye: no scleral icterus  HENT: MMM, oropharynx without erythema/exudate, upper teeth all missing  Neck: supple, no lymphadenopathy, no carotid bruits  Respiratory: clear to auscultation bilaterally, nonlabored respirations  Cardiovascular: regular rate and rhythm without murmurs or?gallops, no edema  Gastrointestinal:?soft, non-tender, non-distended  Genitourinary: no suprapubic tenderness  Musculoskeletal: no gross deformities  Integumentary: no acute rashes or skin lesions present  Neurological: AAOx4, BUE and BLE resting?tremor?that does not resolve with?movement, CN III-XII, normal finger to nose bilaterally, normal rapid alternating hand movements, negative Romberg, no gait abnormality, 5/5 BUE and BLE  Psychiatric: No SI/HI, paranoia but no tangential thoughts, no racing thoughts  Assessment/Plan  1.?Low back pain?M54.5  Continue gabapentin,?continue tizanidine?4 mg as needed  Advised to continue back brace  Advised to call Dr. Zaman office for follow-up  2.?Bipolar disorder?F31.9  Continue follow-up with Dr. Alexander  Continue ziprasidone 40 mg twice daily,?sertraline 100 mg?qhs  3.?Hypokalemia?E87.6  Diabetes mellitus?E11.9  ?A1c today 9.3  Continue metformin 1000 mg twice daily?and?lisinopril 5 mg daily. Repeat A1C at 3 month f/u, will not increase meds given recent psychiatric illness  ?  RTC 3 months or sooner if needed  Referrals  Clinic Follow up, *Est. 06/02/20 12:40:00 CDT, Order for future visit, Low back pain, [place]  [facility]   Problem List/Past Medical History  Ongoing  Bipolar disorder  Depression  Diabetes mellitus  Fibromyalgia  h/o Hep C, took tx, was cured  Hypercholesterolemia  Hypertension  Hypothyroidism  Low back pain  Migraines  Restless legs syndrome  Historical  Coughing  left dequervains  Wears glasses  Procedure/Surgical History  Dequervains (Left) (05/23/2014)  Exploration of tendon sheath of hand (05/23/2014)  Incision, extensor tendon sheath, wrist (eg, deQuervains disease). (05/23/2014)  Biopsy of liver (1990)  Tonsillectomy (1956)  removal IUD   Medications  gabapentin 400 mg oral capsule, 800 mg= 2 cap(s), Oral, TID Resp, 1 refills  levothyroxine 50 mcg (0.05 mg) oral tablet, 0.05 mg= 1 tab(s), Oral, Daily-Resp, 5 refills  lisinopril 5 mg oral tablet, 5 mg= 1 tab(s), Oral, Daily, 5 refills  lovastatin 20 mg oral tablet, 20 mg= 1 tab(s), Oral, Daily, 5 refills  metFORMIN 1000 mg oral tablet, 1000 mg= 1 tab(s), Oral, BID, 5 refills  oxybutynin 5 mg oral tablet, 5 mg= 1 tab(s), Oral, BID-Resp, 5 refills  sertraline 50 mg oral tablet, 100 mg= 2 tab(s), Oral, QHS Resp  tiZANidine 4 mg oral tablet, 4 mg= 1 tab(s), Oral, q8hr Resp  ziprasidone 40 mg oral capsule, 40 mg= 1 cap(s), Oral, BID  Allergies  Benadryl  Vistaril  amitriptyline  aspirin?(cannot take d/t previous interferon treatment)  dextromethorphan  Social History  Abuse/Neglect  No, No, Yes, 03/06/2020  No, No, Yes, 02/19/2020  No, No, Yes, 01/17/2020  Alcohol  Current, 11/08/2015  Employment/School  Retired, 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  Depression.:  Sister.  Diabetes mellitus type 2: Sister and Brother.  Heart disease: Grandfather.  Hypertension.: Sister and Brother.  Stroke: Grandfather.  Immunizations  Vaccine Date Status   influenza virus vaccine, inactivated 01/17/2020 Given   influenza virus vaccine, inactivated 11/15/2018 Given   influenza virus vaccine, inactivated 01/30/2018 Given   pneumococcal 13-valent conjugate vaccine 04/17/2017 Given   tetanus/diphtheria/pertussis, acel(Tdap) 02/20/2017 Given   Health Maintenance  Health Maintenance  ???Pending?(in the next year)  ??? ??OverDue  ??? ? ? ?Pneumococcal Vaccine due??and every?  ??? ? ? ?Alcohol Misuse Screening due??01/01/20??and every 1??year(s)  ??? ? ? ?Cognitive Screening due??01/01/20??and every 1??year(s)  ??? ??Due?  ??? ? ? ?Medicare Annual Wellness Exam due??03/09/20??and every 1??year(s)  ??? ? ? ?Zoster Vaccine due??03/09/20??and every 100??year(s)  ??? ??Due In Future?  ??? ? ? ?Diabetes Maintenance-Eye Exam not due until??04/30/20??and every 1??year(s)  ??? ? ? ?Diabetes Maintenance-Foot Exam not due until??04/30/20??and every 1??year(s)  ??? ? ? ?Hypertension Management-Education not due until??05/01/20??and every 1??year(s)  ??? ? ? ?Smoking Cessation (Diabetes) not due until??08/12/20??and every 2??year(s)  ??? ? ? ?Advance Directive not due until??01/01/21??and every 1??year(s)  ??? ? ? ?Fall Risk Assessment not due until??01/01/21??and every 1??year(s)  ??? ? ? ?Functional Assessment not due until??01/01/21??and every 1??year(s)  ??? ? ? ?Geriatric Depression Screening not due until??01/01/21??and every 1??year(s)  ??? ? ? ?Obesity Screening not due until??01/01/21??and every 1??year(s)  ??? ? ? ?Colorectal Screening (Senior Wellness) not due until??01/18/21??and every 1??year(s)  ??? ? ? ?Diabetes Maintenance-Urine Dipstick not due until??02/18/21??and every 1??year(s)  ??? ? ? ?Diabetes Maintenance-Fasting Lipid Profile not due until??02/19/21??and every 1??year(s)  ??? ? ?  ?Diabetes Maintenance-HgbA1c not due until??03/06/21??and every 1??year(s)  ??? ? ? ?Hypertension Management-Blood Pressure not due until??03/06/21??and every 1??year(s)  ??? ? ? ?Hypertension Management-BMP not due until??03/06/21??and every 1??year(s)  ??? ? ? ?ADL Screening not due until??03/06/21??and every 1??year(s)  ??? ? ? ?Diabetes Maintenance-Serum Creatinine not due until??03/06/21??and every 1??year(s)  ???Satisfied?(in the past 1 year)  ??? ??Satisfied?  ??? ? ? ?ADL Screening on??03/06/20.??Satisfied by Virginie Lamas LPN  ??? ? ? ?Advance Directive on??02/19/20.  ??? ? ? ?Alcohol Misuse Screening on??05/01/19.??Satisfied by Virginie Lamas LPN  ??? ? ? ?Blood Pressure Screening on??03/06/20.??Satisfied by Virginie Lamas LPN  ??? ? ? ?Body Mass Index Check on??03/06/20.??Satisfied by Virginie Lamas LPN  ??? ? ? ?Bone Density Screening on??07/16/19.??Satisfied by Mckenna Sotomayor  ??? ? ? ?Breast Cancer Screening (Senior Wellness) on??07/16/19.??Satisfied by Tawana Pham  ??? ? ? ?Cognitive Screening on??06/25/19.??Satisfied by Virginie Lamas LPN  ??? ? ? ?Colorectal Screening (Senior Wellness) on??01/19/20.??Satisfied by Katlyn Quintana  ??? ? ? ?Depression Screening on??03/06/20.??Satisfied by Virginie Lamas LPN  ??? ? ? ?Diabetes Maintenance-Serum Creatinine on??03/06/20.??Satisfied by Juarez Lopez  ??? ? ? ?Diabetes Screening on??03/06/20.??Satisfied by Juarez Lopez  ??? ? ? ?Fall Risk Assessment on??03/06/20.??Satisfied by Virginie Lamas LPN  ??? ? ? ?Functional Assessment on??02/21/20.  ??? ? ? ?Geriatric Depression Screening on??03/06/20.??Satisfied by Virginie Lamas LPN  ??? ? ? ?Hypertension Management-BMP on??03/06/20.??Satisfied by Juarez Lopez  ??? ? ? ?Influenza Vaccine on??01/17/20.??Satisfied by Gloria Martin LPN  ??? ? ? ?Lipid Screening on??02/20/20.  ??? ? ? ?Obesity Screening  on??03/06/20.??Satisfied by Virginie Lamas LPN  ?      Hypokalemia resolved   Discussed with resident at time of visit.  Recent psychiatric hospitalization; medication modification.  LESI pending.  HPI / PE reviewed. Agree with assessment; Management and plan of care appropriate.

## 2022-05-18 ENCOUNTER — HOSPITAL ENCOUNTER (EMERGENCY)
Facility: HOSPITAL | Age: 72
Discharge: HOME | End: 2022-05-18
Attending: STUDENT IN AN ORGANIZED HEALTH CARE EDUCATION/TRAINING PROGRAM
Payer: COMMERCIAL

## 2022-05-18 ENCOUNTER — TELEPHONE (OUTPATIENT)
Dept: FAMILY MEDICINE | Facility: CLINIC | Age: 72
End: 2022-05-18
Payer: COMMERCIAL

## 2022-05-18 VITALS
SYSTOLIC BLOOD PRESSURE: 127 MMHG | RESPIRATION RATE: 16 BRPM | DIASTOLIC BLOOD PRESSURE: 75 MMHG | TEMPERATURE: 99 F | WEIGHT: 121.94 LBS | BODY MASS INDEX: 19.6 KG/M2 | HEIGHT: 66 IN | HEART RATE: 76 BPM | OXYGEN SATURATION: 98 %

## 2022-05-18 DIAGNOSIS — R53.1 WEAKNESS: Primary | ICD-10-CM

## 2022-05-18 DIAGNOSIS — R73.9 HYPERGLYCEMIA: ICD-10-CM

## 2022-05-18 LAB
ALBUMIN SERPL-MCNC: 3.3 GM/DL (ref 3.4–4.8)
ALBUMIN/GLOB SERPL: 1.4 RATIO (ref 1.1–2)
ALP SERPL-CCNC: 78 UNIT/L (ref 40–150)
ALT SERPL-CCNC: 10 UNIT/L (ref 0–55)
APPEARANCE UR: CLEAR
AST SERPL-CCNC: 10 UNIT/L (ref 5–34)
BACTERIA #/AREA URNS AUTO: ABNORMAL /HPF
BASOPHILS # BLD AUTO: 0.02 X10(3)/MCL (ref 0–0.2)
BASOPHILS NFR BLD AUTO: 0.7 %
BILIRUB UR QL STRIP.AUTO: NEGATIVE MG/DL
BILIRUBIN DIRECT+TOT PNL SERPL-MCNC: 0.3 MG/DL
BUN SERPL-MCNC: 13.6 MG/DL (ref 9.8–20.1)
CALCIUM SERPL-MCNC: 8.5 MG/DL (ref 8.4–10.2)
CHLORIDE SERPL-SCNC: 106 MMOL/L (ref 98–107)
CO2 SERPL-SCNC: 22 MMOL/L (ref 23–31)
COLOR UR AUTO: ABNORMAL
CREAT SERPL-MCNC: 0.92 MG/DL (ref 0.55–1.02)
EOSINOPHIL # BLD AUTO: 0.03 X10(3)/MCL (ref 0–0.9)
EOSINOPHIL NFR BLD AUTO: 1.1 %
ERYTHROCYTE [DISTWIDTH] IN BLOOD BY AUTOMATED COUNT: 15.7 % (ref 11.5–17)
GLOBULIN SER-MCNC: 2.4 GM/DL (ref 2.4–3.5)
GLUCOSE SERPL-MCNC: 244 MG/DL (ref 82–115)
GLUCOSE UR QL STRIP.AUTO: ABNORMAL MG/DL
HCT VFR BLD AUTO: 27.5 % (ref 37–47)
HGB BLD-MCNC: 8.8 GM/DL (ref 12–16)
HYALINE CASTS #/AREA URNS LPF: ABNORMAL /LPF
IMM GRANULOCYTES # BLD AUTO: 0 X10(3)/MCL (ref 0–0.02)
IMM GRANULOCYTES NFR BLD AUTO: 0 % (ref 0–0.43)
KETONES UR QL STRIP.AUTO: NEGATIVE MG/DL
LEUKOCYTE ESTERASE UR QL STRIP.AUTO: NEGATIVE UNIT/L
LYMPHOCYTES # BLD AUTO: 0.75 X10(3)/MCL (ref 0.6–4.6)
LYMPHOCYTES NFR BLD AUTO: 27 %
MCH RBC QN AUTO: 27.3 PG (ref 27–31)
MCHC RBC AUTO-ENTMCNC: 32 MG/DL (ref 33–36)
MCV RBC AUTO: 85.4 FL (ref 80–94)
MONOCYTES # BLD AUTO: 0.32 X10(3)/MCL (ref 0.1–1.3)
MONOCYTES NFR BLD AUTO: 11.5 %
NEUTROPHILS # BLD AUTO: 1.7 X10(3)/MCL (ref 2.1–9.2)
NEUTROPHILS NFR BLD AUTO: 59.7 %
NITRITE UR QL STRIP.AUTO: NEGATIVE
NRBC BLD AUTO-RTO: 0 %
PH UR STRIP.AUTO: 6 [PH]
PLATELET # BLD AUTO: 132 X10(3)/MCL (ref 130–400)
PMV BLD AUTO: 9.2 FL (ref 9.4–12.4)
POCT GLUCOSE: 244 MG/DL (ref 70–110)
POTASSIUM SERPL-SCNC: 3.2 MMOL/L (ref 3.5–5.1)
PROT SERPL-MCNC: 5.7 GM/DL (ref 5.8–7.6)
PROT UR QL STRIP.AUTO: NEGATIVE MG/DL
RBC # BLD AUTO: 3.22 X10(6)/MCL (ref 4.2–5.4)
RBC #/AREA URNS AUTO: ABNORMAL /HPF
RBC UR QL AUTO: NEGATIVE UNIT/L
SODIUM SERPL-SCNC: 136 MMOL/L (ref 136–145)
SP GR UR STRIP.AUTO: 1.02
SQUAMOUS #/AREA URNS LPF: ABNORMAL /HPF
TROPONIN I SERPL-MCNC: <0.01 NG/ML (ref 0–0.04)
UROBILINOGEN UR STRIP-ACNC: ABNORMAL MG/DL
WBC # SPEC AUTO: 2.8 X10(3)/MCL (ref 4.5–11.5)
WBC #/AREA URNS AUTO: ABNORMAL /HPF

## 2022-05-18 PROCEDURE — 80053 COMPREHEN METABOLIC PANEL: CPT | Performed by: STUDENT IN AN ORGANIZED HEALTH CARE EDUCATION/TRAINING PROGRAM

## 2022-05-18 PROCEDURE — 93005 ELECTROCARDIOGRAM TRACING: CPT

## 2022-05-18 PROCEDURE — 99285 EMERGENCY DEPT VISIT HI MDM: CPT | Mod: 25

## 2022-05-18 PROCEDURE — 36415 COLL VENOUS BLD VENIPUNCTURE: CPT | Performed by: STUDENT IN AN ORGANIZED HEALTH CARE EDUCATION/TRAINING PROGRAM

## 2022-05-18 PROCEDURE — 94761 N-INVAS EAR/PLS OXIMETRY MLT: CPT

## 2022-05-18 PROCEDURE — 81001 URINALYSIS AUTO W/SCOPE: CPT | Performed by: STUDENT IN AN ORGANIZED HEALTH CARE EDUCATION/TRAINING PROGRAM

## 2022-05-18 PROCEDURE — 96360 HYDRATION IV INFUSION INIT: CPT

## 2022-05-18 PROCEDURE — 25000003 PHARM REV CODE 250: Performed by: STUDENT IN AN ORGANIZED HEALTH CARE EDUCATION/TRAINING PROGRAM

## 2022-05-18 PROCEDURE — 84484 ASSAY OF TROPONIN QUANT: CPT | Performed by: STUDENT IN AN ORGANIZED HEALTH CARE EDUCATION/TRAINING PROGRAM

## 2022-05-18 PROCEDURE — 85025 COMPLETE CBC W/AUTO DIFF WBC: CPT | Performed by: STUDENT IN AN ORGANIZED HEALTH CARE EDUCATION/TRAINING PROGRAM

## 2022-05-18 RX ORDER — METFORMIN HYDROCHLORIDE 1000 MG/1
TABLET ORAL
COMMUNITY
Start: 2021-09-10 | End: 2022-05-20 | Stop reason: SDUPTHER

## 2022-05-18 RX ORDER — PANTOPRAZOLE SODIUM 40 MG/1
40 TABLET, DELAYED RELEASE ORAL
COMMUNITY
Start: 2021-10-11

## 2022-05-18 RX ORDER — CITALOPRAM 10 MG/1
10 TABLET ORAL
COMMUNITY
Start: 2021-06-02

## 2022-05-18 RX ORDER — OXYBUTYNIN CHLORIDE 5 MG/1
5 TABLET ORAL
COMMUNITY
Start: 2021-09-10 | End: 2022-05-20 | Stop reason: ALTCHOICE

## 2022-05-18 RX ORDER — LISINOPRIL 2.5 MG/1
TABLET ORAL
COMMUNITY
Start: 2022-01-27 | End: 2022-05-20 | Stop reason: ALTCHOICE

## 2022-05-18 RX ADMIN — SODIUM CHLORIDE 1000 ML: 9 INJECTION, SOLUTION INTRAVENOUS at 07:05

## 2022-05-19 NOTE — ED PROVIDER NOTES
Encounter Date: 5/18/2022       History     Chief Complaint   Patient presents with    Weakness     Pt reports feeling weak and shaky. Out of insulin x 1 week due to recent admit at [facility] with d/c to group home last week.      71-year-old female past medical history of Parkinson's disease and diabetes presenting today with fatigue.  She was recently admitted in inpatient psychiatric hospital.  After that she was discharged to group home which she has not been taking her head diabetes medications.  She has been feeling fatigued past couple days which she called EM S. She denies any abdominal pain, nausea, vomiting.        General Illness   The current episode started just prior to arrival. The problem occurs rarely. The problem has been unchanged. Nothing relieves the symptoms. Nothing aggravates the symptoms. Pertinent negatives include no fever, no abdominal pain, no diarrhea, no nausea, no vomiting, no congestion, no headaches, no cough, no shortness of breath, no wheezing, no discharge, no pain and no eye redness.     Review of patient's allergies indicates:  No Known Allergies  Past Medical History:   Diagnosis Date    Diabetes mellitus     High cholesterol     Parkinson's disease     Schizoaffective disorder      Past Surgical History:   Procedure Laterality Date    TONSILLECTOMY       No family history on file.  Social History     Tobacco Use    Smoking status: Never Smoker    Smokeless tobacco: Never Used   Substance Use Topics    Alcohol use: Never    Drug use: Never     Review of Systems   Constitutional: Negative for activity change, chills and fever.   HENT: Negative for congestion and facial swelling.    Eyes: Negative for pain, discharge and redness.   Respiratory: Negative for cough, shortness of breath and wheezing.    Cardiovascular: Negative for chest pain and leg swelling.   Gastrointestinal: Negative for abdominal pain, diarrhea, nausea and vomiting.   Genitourinary:  Negative for difficulty urinating, vaginal bleeding and vaginal discharge.   Musculoskeletal: Negative for gait problem and neck stiffness.   Skin: Negative for color change and pallor.   Neurological: Negative for dizziness and headaches.       Physical Exam     Initial Vitals [05/18/22 1812]   BP Pulse Resp Temp SpO2   (!) 157/89 75 20 99.2 °F (37.3 °C) 99 %      MAP       --         Physical Exam    Nursing note and vitals reviewed.  Constitutional: She appears well-developed. She is not diaphoretic. No distress.   HENT:   Head: Normocephalic and atraumatic.   Eyes: Conjunctivae and EOM are normal. Right eye exhibits no discharge. Left eye exhibits no discharge.   Neck: Neck supple. No tracheal deviation present.   Normal range of motion.  Cardiovascular: Normal rate and regular rhythm. Exam reveals no friction rub.    No murmur heard.  Pulmonary/Chest: Breath sounds normal. No respiratory distress. She has no wheezes.   Abdominal: Abdomen is soft. She exhibits no distension. There is no abdominal tenderness.   Musculoskeletal:         General: Normal range of motion.      Cervical back: Normal range of motion and neck supple.     Neurological: She is alert and oriented to person, place, and time. She has normal strength.   Skin: Skin is warm and dry.         ED Course   Procedures  Labs Reviewed   COMPREHENSIVE METABOLIC PANEL - Abnormal; Notable for the following components:       Result Value    Potassium Level 3.2 (*)     Carbon Dioxide 22 (*)     Glucose Level 244 (*)     Protein Total 5.7 (*)     Albumin Level 3.3 (*)     All other components within normal limits   URINALYSIS, REFLEX TO URINE CULTURE - Abnormal; Notable for the following components:    Color, UA Light-Yellow (*)     Glucose, UA 4+ (*)     Urobilinogen, UA 1+ (*)     Squamous Epithelial Cells, UA Trace (*)     All other components within normal limits   CBC WITH DIFFERENTIAL - Abnormal; Notable for the following components:    WBC 2.8 (*)      RBC 3.22 (*)     Hgb 8.8 (*)     Hct 27.5 (*)     MCHC 32.0 (*)     MPV 9.2 (*)     Neut # 1.7 (*)     All other components within normal limits   POCT GLUCOSE - Abnormal; Notable for the following components:    POCT Glucose 244 (*)     All other components within normal limits   TROPONIN I - Normal   CBC W/ AUTO DIFFERENTIAL    Narrative:     The following orders were created for panel order CBC auto differential.  Procedure                               Abnormality         Status                     ---------                               -----------         ------                     CBC with Differential[[id number]]        Abnormal            Final result                 Please view results for these tests on the individual orders.   EXTRA TUBES    Narrative:     The following orders were created for panel order EXTRA TUBES.  Procedure                               Abnormality         Status                     ---------                               -----------         ------                     Lavender Top Hold[[id number]]                                In process                 Gold Top Hold[[id number]]                                    In process                   Please view results for these tests on the individual orders.   LAVENDER TOP HOLD   GOLD TOP HOLD   POCT GLUCOSE, HAND-HELD DEVICE     EKG Readings: (Independently Interpreted)   T Waves Flipped: V3. Clinical Impression: Normal Sinus Rhythm Other Impression: old TWI       Imaging Results          X-Ray Chest AP Portable (In process)                  Medications   sodium chloride 0.9% bolus 1,000 mL (0 mLs Intravenous Stopped 5/18/22 2001)     Medical Decision Making:   ED Management:  Pt presents to the ED with complaints of fatigue     Differential considerations include: anemia, hyponatremia, UTI, PNA, ACS    Evaluated the patient in the emergency department.  She was stable and well appearing.  She had no focal deficits on examination.  Her  EKG was unremarkable.  Her troponin was negative.  Her chest x-ray is unremarkable.  She was mildly anemic was at her baseline.  Her urine was unremarkable.  The rest her blood work was unremarkable.  She is being discharged home in stable condition at this time.  She is instructed to follow-up with the PCP and she verbalized understanding.                        Clinical Impression:   Final diagnoses:  [R73.9] Hyperglycemia  [R53.1] Weakness (Primary)          ED Disposition Condition    Discharge Stable        ED Prescriptions     None        Follow-up Information     Follow up With Specialties Details Why Contact Info    Guadalupe Galeano MD Family Medicine Call in 1 day  [address] [facility] [address] [phone]             Elia Hugo MD  05/18/22 8959

## 2022-05-19 NOTE — TELEPHONE ENCOUNTER
I was notified on after hours phone about patient being out of insulin, having sugars > 250, and having weakness and trembling.  She said she recently had left [facility] as well.  Because of the patient's symptoms, the patient was instructed to go to nearest ED.  Patient had ambulance already at her house at time of call as she had called for help prior to contacting [facility] after hours.      Umer Bess D.O.  [employer] PGY-3

## 2022-05-20 ENCOUNTER — OFFICE VISIT (OUTPATIENT)
Dept: FAMILY MEDICINE | Facility: CLINIC | Age: 72
End: 2022-05-20
Payer: COMMERCIAL

## 2022-05-20 VITALS
HEIGHT: 66 IN | BODY MASS INDEX: 19.24 KG/M2 | RESPIRATION RATE: 18 BRPM | HEART RATE: 73 BPM | OXYGEN SATURATION: 98 % | TEMPERATURE: 99 F | WEIGHT: 119.69 LBS | SYSTOLIC BLOOD PRESSURE: 136 MMHG | DIASTOLIC BLOOD PRESSURE: 88 MMHG

## 2022-05-20 DIAGNOSIS — R32 URINARY INCONTINENCE, UNSPECIFIED TYPE: ICD-10-CM

## 2022-05-20 DIAGNOSIS — E11.9 TYPE 2 DIABETES MELLITUS WITHOUT COMPLICATION, WITHOUT LONG-TERM CURRENT USE OF INSULIN: Primary | ICD-10-CM

## 2022-05-20 DIAGNOSIS — R63.4 WEIGHT LOSS: ICD-10-CM

## 2022-05-20 DIAGNOSIS — G47.00 INSOMNIA, UNSPECIFIED TYPE: ICD-10-CM

## 2022-05-20 DIAGNOSIS — D64.9 ANEMIA, UNSPECIFIED TYPE: ICD-10-CM

## 2022-05-20 DIAGNOSIS — E03.9 HYPOTHYROIDISM, UNSPECIFIED TYPE: ICD-10-CM

## 2022-05-20 PROBLEM — F31.9 BIPOLAR DEPRESSION: Status: ACTIVE | Noted: 2022-05-20

## 2022-05-20 PROBLEM — E78.00 HIGH CHOLESTEROL: Status: ACTIVE | Noted: 2022-05-20

## 2022-05-20 PROBLEM — G20.A1 PARKINSON'S DISEASE: Status: ACTIVE | Noted: 2022-05-20

## 2022-05-20 PROBLEM — F25.9 SCHIZOAFFECTIVE DISORDER: Status: ACTIVE | Noted: 2022-05-20

## 2022-05-20 LAB
ALBUMIN SERPL-MCNC: 3.8 GM/DL (ref 3.4–4.8)
ALBUMIN/GLOB SERPL: 1.3 RATIO (ref 1.1–2)
ALP SERPL-CCNC: 82 UNIT/L (ref 40–150)
ALT SERPL-CCNC: 12 UNIT/L (ref 0–55)
AST SERPL-CCNC: 12 UNIT/L (ref 5–34)
BASOPHILS # BLD AUTO: 0.04 X10(3)/MCL (ref 0–0.2)
BASOPHILS NFR BLD AUTO: 0.8 %
BILIRUBIN DIRECT+TOT PNL SERPL-MCNC: 0.5 MG/DL
BUN SERPL-MCNC: 18.7 MG/DL (ref 9.8–20.1)
CALCIUM SERPL-MCNC: 9.6 MG/DL (ref 8.4–10.2)
CHLORIDE SERPL-SCNC: 103 MMOL/L (ref 98–107)
CHOLEST SERPL-MCNC: 144 MG/DL
CHOLEST/HDLC SERPL: 3 {RATIO} (ref 0–5)
CO2 SERPL-SCNC: 23 MMOL/L (ref 23–31)
CREAT SERPL-MCNC: 0.86 MG/DL (ref 0.55–1.02)
EOSINOPHIL # BLD AUTO: 0.06 X10(3)/MCL (ref 0–0.9)
EOSINOPHIL NFR BLD AUTO: 1.2 %
ERYTHROCYTE [DISTWIDTH] IN BLOOD BY AUTOMATED COUNT: 15.5 % (ref 11.5–17)
EST. AVERAGE GLUCOSE BLD GHB EST-MCNC: 191.5 MG/DL
GLOBULIN SER-MCNC: 2.9 GM/DL (ref 2.4–3.5)
GLUCOSE SERPL-MCNC: 223 MG/DL (ref 82–115)
HBA1C MFR BLD: 8.3 %
HBA1C MFR BLD: 8.4 %
HCT VFR BLD AUTO: 33.7 % (ref 37–47)
HDLC SERPL-MCNC: 44 MG/DL (ref 35–60)
HGB BLD-MCNC: 11 GM/DL (ref 12–16)
IMM GRANULOCYTES # BLD AUTO: 0.03 X10(3)/MCL (ref 0–0.02)
IMM GRANULOCYTES NFR BLD AUTO: 0.6 % (ref 0–0.43)
LDLC SERPL CALC-MCNC: 76 MG/DL (ref 50–140)
LYMPHOCYTES # BLD AUTO: 0.8 X10(3)/MCL (ref 0.6–4.6)
LYMPHOCYTES NFR BLD AUTO: 15.9 %
MAGNESIUM SERPL-MCNC: 1.1 MG/DL (ref 1.6–2.6)
MCH RBC QN AUTO: 27.6 PG (ref 27–31)
MCHC RBC AUTO-ENTMCNC: 32.6 MG/DL (ref 33–36)
MCV RBC AUTO: 84.7 FL (ref 80–94)
MONOCYTES # BLD AUTO: 0.4 X10(3)/MCL (ref 0.1–1.3)
MONOCYTES NFR BLD AUTO: 8 %
NEUTROPHILS # BLD AUTO: 3.7 X10(3)/MCL (ref 2.1–9.2)
NEUTROPHILS NFR BLD AUTO: 73.5 %
NRBC BLD AUTO-RTO: 0 %
PLATELET # BLD AUTO: 225 X10(3)/MCL (ref 130–400)
PMV BLD AUTO: 9.7 FL (ref 9.4–12.4)
POTASSIUM SERPL-SCNC: 3.4 MMOL/L (ref 3.5–5.1)
PROT SERPL-MCNC: 6.7 GM/DL (ref 5.8–7.6)
RBC # BLD AUTO: 3.98 X10(6)/MCL (ref 4.2–5.4)
SODIUM SERPL-SCNC: 134 MMOL/L (ref 136–145)
T4 FREE SERPL-MCNC: 1.03 NG/DL (ref 0.7–1.48)
TRIGL SERPL-MCNC: 119 MG/DL (ref 37–140)
TSH SERPL-ACNC: 1.56 UIU/ML (ref 0.35–4.94)
VLDLC SERPL CALC-MCNC: 24 MG/DL
WBC # SPEC AUTO: 5 X10(3)/MCL (ref 4.5–11.5)

## 2022-05-20 PROCEDURE — 83036 HEMOGLOBIN GLYCOSYLATED A1C: CPT | Mod: PBBFAC

## 2022-05-20 PROCEDURE — 80053 COMPREHEN METABOLIC PANEL: CPT

## 2022-05-20 PROCEDURE — 83036 HEMOGLOBIN GLYCOSYLATED A1C: CPT

## 2022-05-20 PROCEDURE — 36415 COLL VENOUS BLD VENIPUNCTURE: CPT

## 2022-05-20 PROCEDURE — 99214 OFFICE O/P EST MOD 30 MIN: CPT | Mod: PBBFAC

## 2022-05-20 PROCEDURE — 84443 ASSAY THYROID STIM HORMONE: CPT

## 2022-05-20 PROCEDURE — 84439 ASSAY OF FREE THYROXINE: CPT

## 2022-05-20 PROCEDURE — 85025 COMPLETE CBC W/AUTO DIFF WBC: CPT

## 2022-05-20 PROCEDURE — 80061 LIPID PANEL: CPT

## 2022-05-20 PROCEDURE — 83735 ASSAY OF MAGNESIUM: CPT

## 2022-05-20 RX ORDER — LANCETS
EACH MISCELLANEOUS
Qty: 120 EACH | Refills: 0 | Status: SHIPPED | OUTPATIENT
Start: 2022-05-20

## 2022-05-20 RX ORDER — OXYBUTYNIN CHLORIDE 5 MG/1
5 TABLET, EXTENDED RELEASE ORAL DAILY
Qty: 90 TABLET | Refills: 0 | Status: SHIPPED | OUTPATIENT
Start: 2022-05-20 | End: 2022-05-20

## 2022-05-20 RX ORDER — SIMVASTATIN 20 MG/1
20 TABLET, FILM COATED ORAL NIGHTLY
COMMUNITY

## 2022-05-20 RX ORDER — RIVASTIGMINE 4.6 MG/24H
1 PATCH, EXTENDED RELEASE TRANSDERMAL DAILY
COMMUNITY

## 2022-05-20 RX ORDER — LEVOTHYROXINE SODIUM 50 UG/1
50 TABLET ORAL
Qty: 90 TABLET | Refills: 0 | Status: SHIPPED | OUTPATIENT
Start: 2022-05-20 | End: 2022-08-18

## 2022-05-20 RX ORDER — METFORMIN HYDROCHLORIDE 1000 MG/1
1000 TABLET ORAL 2 TIMES DAILY WITH MEALS
Qty: 180 TABLET | Refills: 0 | Status: SHIPPED | OUTPATIENT
Start: 2022-05-20 | End: 2022-08-18

## 2022-05-20 RX ORDER — FENOFIBRATE 145 MG/1
48 TABLET, FILM COATED ORAL DAILY
COMMUNITY

## 2022-05-20 RX ORDER — DAPAGLIFLOZIN 5 MG/1
5 TABLET, FILM COATED ORAL DAILY
Qty: 30 TABLET | Refills: 0 | Status: SHIPPED | OUTPATIENT
Start: 2022-05-20 | End: 2022-05-20

## 2022-05-20 RX ORDER — TALC
6 POWDER (GRAM) TOPICAL NIGHTLY
Qty: 60 TABLET | Refills: 0 | Status: SHIPPED | OUTPATIENT
Start: 2022-05-20 | End: 2022-06-19

## 2022-05-20 RX ORDER — INSULIN PUMP SYRINGE, 3 ML
EACH MISCELLANEOUS
Qty: 1 EACH | Refills: 0 | Status: SHIPPED | OUTPATIENT
Start: 2022-05-20 | End: 2023-05-20

## 2022-05-20 NOTE — PROGRESS NOTES
History & Physical  [facility]      Subjective:      Brenda Hernández is a 71 y.o. female with past medical history including DM II,  presenting to clinic here for hospital f/u for hyperglycemia.     Pt was discharged to [facility] s/ recent [facility] admission in 4/2022. She was evicted from her appt during [facility] anders, and was discharged to a group home. Her living conditions are not adequate and she doesn't feel safe in her conditions 2/2 poor supervision and inability to care for self w/ her physical limitations (I.e tremor, weakness). Her medications are placed on table daily, but not given to her and she doesn't know which medications she is taking. She has no assistance w/ taking baths, which are difficult for her. Provided meals are minimal in content and not consistent. She has completed an interview with a nursing home via [facility], but this is pending. Patient is interested in home health, and [employer] worker (Hillary) says this is feasible in her living setting.     Pt recently went to ED on 5/18 s/p moving to group home w/ hyperglycemia in mid 200s and fatigue. Unsure of which DM meds as noted above, Jody () reports pt is getting Metformin 500 mg BID. Pt denies polydipsia or polyuria, but does have more  irregular bladder habits s/p discontinuation of Oxybutinin. She denies any known wounds or infections, including UTI or yeast infections. .    Review of Systems   Constitutional: Positive for malaise/fatigue and weight loss. Negative for chills and fever.   HENT: Negative for hearing loss.    Respiratory: Negative for cough.    Cardiovascular: Negative for chest pain and palpitations.   Gastrointestinal: Negative for abdominal pain, constipation, diarrhea, nausea and vomiting.   Genitourinary: Positive for urgency. Negative for dysuria.   Musculoskeletal: Negative for back pain, falls and myalgias.   Skin: Negative for rash.   Neurological: Positive for  tremors and weakness. Negative for dizziness, tingling, speech change, loss of consciousness and headaches.   Endo/Heme/Allergies: Does not bruise/bleed easily.   Psychiatric/Behavioral: Positive for depression. Negative for suicidal ideas. The patient is nervous/anxious and has insomnia.          Past Medical History:   Diagnosis Date    Anemia     Bipolar depression     Diabetes mellitus     High cholesterol     Hypothyroidism, unspecified     Parkinson's disease     Schizoaffective disorder        Past Surgical History:   Procedure Laterality Date    TONSILLECTOMY         No family history on file.    Social History     Socioeconomic History    Marital status: Single   Tobacco Use    Smoking status: Never Smoker    Smokeless tobacco: Never Used   Substance and Sexual Activity    Alcohol use: Never    Drug use: Never       Current Outpatient Medications   Medication Sig Dispense Refill    fenofibrate (TRICOR) 145 MG tablet Take 48 mg by mouth once daily.      Iron AspGly/C/B12/FA/Ca-th/Suc (CHROMAGEN FA, W/SUMALATE, ORAL) Take by mouth.      rivastigmine (EXELON) 4.6 mg/24 hour PT24 Place 1 patch onto the skin once daily.      simvastatin (ZOCOR) 20 MG tablet Take 20 mg by mouth every evening.      blood sugar diagnostic Strp To check BG 4 times daily, to use with insurance preferred meter 120 each 0    blood-glucose meter kit To check BGs 4  times daily, to use with insurance preferred meter 1 each 0    citalopram (CELEXA) 10 MG tablet Take 10 mg by mouth.      lancets Misc To check BG 4 times daily, to use with insurance preferred meter 120 each 0    levothyroxine (SYNTHROID) 50 MCG tablet Take 1 tablet (50 mcg total) by mouth before breakfast. 90 tablet 0    melatonin (MELATIN) 3 mg tablet Take 2 tablets (6 mg total) by mouth every evening. For insomnia/trouble sleeping 60 tablet 0    metFORMIN (GLUCOPHAGE) 1000 MG tablet Take 1 tablet (1,000 mg total) by mouth 2 (two) times daily with  "meals. 180 tablet 0    mirabegron (MYRBETRIQ) 25 mg Tb24 ER tablet Take 1 tablet (25 mg total) by mouth once daily. 30 tablet 0    pantoprazole (PROTONIX) 40 MG tablet Take 40 mg by mouth.      semaglutide (RYBELSUS) 3 mg tablet Take 1 tablet (3 mg total) by mouth once daily. 30 tablet 0    valbenazine (INGREZZA) 40 mg Cap Take 40 mg by mouth.       No current facility-administered medications for this visit.       Review of patient's allergies indicates:  No Known Allergies    Objective:   /88 (BP Location: Right arm)   Pulse 73   Temp 98.8 °F (37.1 °C)   Resp 18   Ht 5' 6.04" (1.677 m)   Wt 54.3 kg (119 lb 11.4 oz)   SpO2 98%   BMI 19.30 kg/m²      Physical Exam   Constitutional: She is cooperative. No distress.   HENT:   Head: Normocephalic and atraumatic.   Mouth/Throat: Mucous membranes are moist. No oropharyngeal exudate.   Eyes: Pupils are equal, round, and reactive to light.   Cardiovascular: Normal rate, regular rhythm, normal heart sounds and normal pulses.   Pulmonary/Chest: Effort normal.   Abdominal: Soft. She exhibits no mass.   Neurological: She is alert. She displays tremor.   Skin: Skin is warm and dry.   Psychiatric: Her mood appears anxious. Cognition and memory are impaired. She has a flat affect.        Assessment:   71 y.o. with        1. Type 2 diabetes mellitus without complication, without long-term current use of insulin    2. Lives in group home    3. Urinary incontinence, unspecified type    4. Hypothyroidism, unspecified type    5. Weight loss    6. Anemia, unspecified type    7. Insomnia, unspecified type         Plan:     Orders Placed This Encounter    Microalbumin/creatinine urine ratio    Magnesium    CBC auto differential    Comprehensive metabolic panel    Lipid panel    Hemoglobin A1C    CBC with Differential    TSH    T4, Free    Ambulatory referral/consult to Home Health    POCT HEMOGLOBIN A1C    metFORMIN (GLUCOPHAGE) 1000 MG tablet    " levothyroxine (SYNTHROID) 50 MCG tablet    mirabegron (MYRBETRIQ) 25 mg Tb24 ER tablet    melatonin (MELATIN) 3 mg tablet    blood-glucose meter kit    lancets Misc    blood sugar diagnostic Strp    semaglutide (RYBELSUS) 3 mg tablet      1. DM II   -HgbA1c ordered today. POC 8.3%.   -Will increase Metformin to 1000 mg BID and add Semaglutide 3mg oral. Initially planned to add Farxiga but given pt's urinary issues, concerned about increase risk of  issues. Discontinued at pharmacy.  -Urine Microalbumin/Cr ratio, Lipid Panel, CMP ordered.  -Rx glucometer and testing supplies. Spoke to  who comited to taking at least CBG AM fasting and once 2H post prandial value.    2. Urinary Incontinence  -Pt previously rx Oxybutinyn. Planned to re-rx initally. However, pt now on Rivastigmine and medications may interact. Discontinued at pharmacy. Will rx Mirabegnon 25 mg qd instead.     3. Hypothyroidism   -Rx for Synthroid 50 mcg given.   -TSH and Free T4 ordered     4. Lives in Group Home  -Discussed case w/ Karen Zaman LCSW who will assist with nursing home placement process (will contact [place]'s to see previous plan) and group home issues.  - agreed to assisting w/ CBG monitoring and assisting with baths.     5. Weight loss  -Pt approx 54.3 kg, was approx 56 kg while hospitalized. Pt w/ poor nutrition at home, which she says is partially to group home setting and inability to cook for self. Discussed implementing Glucerna TID in between meals. Further optimize at future appointments.     6. Anemia   -CBC ordered. Fe studies previously ordered, non-specific.  -Continue Fe supplementation as rx.   -Needs further w/u at next visit and to discuss hx of Ca screening. Last FOBT in 2020 per chart review.     7. Insomnia    -Rx Melatonin 6 mg qHS.     The patient's diagnosis and medications were discussed.    I will review labs and notify patient with results either by mail or contact by  phone.    Follow up in about 1 week (around 5/27/2022) for f/u of DM and also chronic medical issues. Social situation .     Ava Kulkarni MD   [facility] Family Medicine PGY-2  05/20/2022

## 2022-05-23 NOTE — PROGRESS NOTES
Faculty addendum: Patient discussed with resident. Chart was reviewed including vitals, labs, etc. Care provided reasonable and necessary. I participated in the management of the patient and was immediately available throughout the encounter. Services were furnished in a primary care center located in the outpatient department of a [facility] hospital. I agree with the resident's findings and plan as documented in the resident's note.
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
## 2022-05-24 ENCOUNTER — TELEPHONE (OUTPATIENT)
Dept: FAMILY MEDICINE | Facility: CLINIC | Age: 72
End: 2022-05-24
Payer: COMMERCIAL
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
## 2022-05-26 ENCOUNTER — OFFICE VISIT (OUTPATIENT)
Dept: FAMILY MEDICINE | Facility: CLINIC | Age: 72
End: 2022-05-26
Payer: COMMERCIAL

## 2022-05-26 VITALS
WEIGHT: 119.69 LBS | HEIGHT: 66 IN | BODY MASS INDEX: 19.24 KG/M2 | OXYGEN SATURATION: 99 % | SYSTOLIC BLOOD PRESSURE: 123 MMHG | TEMPERATURE: 99 F | HEART RATE: 71 BPM | DIASTOLIC BLOOD PRESSURE: 78 MMHG

## 2022-05-26 DIAGNOSIS — Z71.9 ENCOUNTER FOR COUNSELING: ICD-10-CM

## 2022-05-26 DIAGNOSIS — E11.9 TYPE 2 DIABETES MELLITUS WITHOUT COMPLICATION, WITHOUT LONG-TERM CURRENT USE OF INSULIN: Primary | ICD-10-CM

## 2022-05-26 PROCEDURE — 99213 OFFICE O/P EST LOW 20 MIN: CPT | Mod: PBBFAC
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
## 2022-05-31 NOTE — PROGRESS NOTES
I discussed the case with the resident. The chart was reviewed. I agree with the assessment and plan. Care provided was reasonable and necessary.

## 2022-06-06 PROBLEM — F41.1 GENERALIZED ANXIETY DISORDER: Status: ACTIVE | Noted: 2022-06-06

## 2022-06-06 PROBLEM — B19.20 HEPATITIS C VIRUS INFECTION: Status: ACTIVE | Noted: 2022-06-06

## 2022-06-06 PROBLEM — I10 HYPERTENSION: Status: ACTIVE | Noted: 2022-06-06

## 2022-06-06 PROBLEM — R32 URINARY INCONTINENCE: Status: ACTIVE | Noted: 2022-06-06

## 2022-06-06 PROBLEM — G20.A1 PARKINSON'S DISEASE: Status: RESOLVED | Noted: 2022-05-20 | Resolved: 2022-06-06

## 2022-06-06 PROBLEM — T43.505A NEUROLEPTIC-INDUCED TARDIVE DYSKINESIA: Status: ACTIVE | Noted: 2022-06-06

## 2022-06-06 PROBLEM — M79.7 FIBROMYOSITIS: Status: ACTIVE | Noted: 2022-06-06

## 2022-06-06 PROBLEM — G24.01 NEUROLEPTIC-INDUCED TARDIVE DYSKINESIA: Status: ACTIVE | Noted: 2022-06-06

## 2022-06-06 PROBLEM — G25.81 RESTLESS LEGS SYNDROME: Status: ACTIVE | Noted: 2022-06-06

## 2022-06-06 PROBLEM — R54 FRAILTY: Status: ACTIVE | Noted: 2022-06-06

## 2022-06-06 PROBLEM — G43.909 MIGRAINE HEADACHE: Status: ACTIVE | Noted: 2022-06-06

## 2022-06-06 PROBLEM — K21.9 GASTROESOPHAGEAL REFLUX DISEASE WITHOUT ESOPHAGITIS: Status: ACTIVE | Noted: 2022-06-06

## 2022-06-21 ENCOUNTER — TELEPHONE (OUTPATIENT)
Dept: FAMILY MEDICINE | Facility: CLINIC | Age: 72
End: 2022-06-21
Payer: COMMERCIAL

## 2022-07-26 PROBLEM — B19.20 HEPATITIS C VIRUS INFECTION: Status: RESOLVED | Noted: 2022-06-06 | Resolved: 2022-07-26

## 2022-09-20 ENCOUNTER — DOCUMENTATION ONLY (OUTPATIENT)
Dept: FAMILY MEDICINE | Facility: CLINIC | Age: 72
End: 2022-09-20
Payer: COMMERCIAL

## 2024-01-15 ENCOUNTER — HOSPITAL ENCOUNTER (EMERGENCY)
Facility: HOSPITAL | Age: 74
Discharge: HOME | End: 2024-01-15
Attending: INTERNAL MEDICINE
Payer: MEDICARE

## 2024-01-15 VITALS
RESPIRATION RATE: 18 BRPM | HEIGHT: 66 IN | WEIGHT: 130 LBS | SYSTOLIC BLOOD PRESSURE: 129 MMHG | TEMPERATURE: 97 F | BODY MASS INDEX: 20.89 KG/M2 | OXYGEN SATURATION: 99 % | DIASTOLIC BLOOD PRESSURE: 79 MMHG | HEART RATE: 67 BPM

## 2024-01-15 DIAGNOSIS — N30.00 ACUTE CYSTITIS WITHOUT HEMATURIA: ICD-10-CM

## 2024-01-15 DIAGNOSIS — D64.9 ANEMIA, UNSPECIFIED TYPE: ICD-10-CM

## 2024-01-15 DIAGNOSIS — G20.B2 PARKINSON'S DISEASE WITH DYSKINESIA AND FLUCTUATING MANIFESTATIONS: Primary | ICD-10-CM

## 2024-01-15 LAB
ALBUMIN SERPL-MCNC: 3.8 G/DL (ref 3.4–4.8)
ALBUMIN/GLOB SERPL: 1.5 RATIO (ref 1.1–2)
ALP SERPL-CCNC: 38 UNIT/L (ref 40–150)
ALT SERPL-CCNC: 9 UNIT/L (ref 0–55)
APPEARANCE UR: CLEAR
AST SERPL-CCNC: 13 UNIT/L (ref 5–34)
BACTERIA #/AREA URNS AUTO: ABNORMAL /HPF
BASOPHILS # BLD AUTO: 0.04 X10(3)/MCL
BASOPHILS NFR BLD AUTO: 1.1 %
BILIRUB SERPL-MCNC: 0.5 MG/DL
BILIRUB UR QL STRIP.AUTO: NEGATIVE
BUN SERPL-MCNC: 29 MG/DL (ref 9.8–20.1)
CALCIUM SERPL-MCNC: 9.1 MG/DL (ref 8.4–10.2)
CHLORIDE SERPL-SCNC: 107 MMOL/L (ref 98–107)
CO2 SERPL-SCNC: 21 MMOL/L (ref 23–31)
COLOR UR AUTO: YELLOW
CREAT SERPL-MCNC: 0.86 MG/DL (ref 0.55–1.02)
EOSINOPHIL # BLD AUTO: 0.12 X10(3)/MCL (ref 0–0.9)
EOSINOPHIL NFR BLD AUTO: 3.2 %
ERYTHROCYTE [DISTWIDTH] IN BLOOD BY AUTOMATED COUNT: 17.5 % (ref 11.5–17)
GFR SERPLBLD CREATININE-BSD FMLA CKD-EPI: >60 MLS/MIN/1.73/M2
GLOBULIN SER-MCNC: 2.6 GM/DL (ref 2.4–3.5)
GLUCOSE SERPL-MCNC: 97 MG/DL (ref 82–115)
GLUCOSE UR QL STRIP.AUTO: NEGATIVE
HCT VFR BLD AUTO: 30.2 % (ref 37–47)
HGB BLD-MCNC: 9 G/DL (ref 12–16)
IMM GRANULOCYTES # BLD AUTO: 0.01 X10(3)/MCL (ref 0–0.04)
IMM GRANULOCYTES NFR BLD AUTO: 0.3 %
KETONES UR QL STRIP.AUTO: NEGATIVE
LEUKOCYTE ESTERASE UR QL STRIP.AUTO: ABNORMAL
LYMPHOCYTES # BLD AUTO: 0.59 X10(3)/MCL (ref 0.6–4.6)
LYMPHOCYTES NFR BLD AUTO: 15.9 %
MAGNESIUM SERPL-MCNC: 1.4 MG/DL (ref 1.6–2.6)
MCH RBC QN AUTO: 23.4 PG (ref 27–31)
MCHC RBC AUTO-ENTMCNC: 29.8 G/DL (ref 33–36)
MCV RBC AUTO: 78.4 FL (ref 80–94)
MONOCYTES # BLD AUTO: 0.27 X10(3)/MCL (ref 0.1–1.3)
MONOCYTES NFR BLD AUTO: 7.3 %
NEUTROPHILS # BLD AUTO: 2.69 X10(3)/MCL (ref 2.1–9.2)
NEUTROPHILS NFR BLD AUTO: 72.2 %
NITRITE UR QL STRIP.AUTO: NEGATIVE
PH UR STRIP.AUTO: 5 [PH]
PLATELET # BLD AUTO: 177 X10(3)/MCL (ref 130–400)
PMV BLD AUTO: 9.7 FL (ref 7.4–10.4)
POTASSIUM SERPL-SCNC: 4 MMOL/L (ref 3.5–5.1)
PROT SERPL-MCNC: 6.4 GM/DL (ref 5.8–7.6)
PROT UR QL STRIP.AUTO: NEGATIVE
RBC # BLD AUTO: 3.85 X10(6)/MCL (ref 4.2–5.4)
RBC #/AREA URNS AUTO: ABNORMAL /HPF
RBC UR QL AUTO: NEGATIVE
SODIUM SERPL-SCNC: 140 MMOL/L (ref 136–145)
SP GR UR STRIP.AUTO: 1.01 (ref 1–1.03)
SQUAMOUS #/AREA URNS AUTO: ABNORMAL /HPF
UROBILINOGEN UR STRIP-ACNC: 0.2
WBC # SPEC AUTO: 3.72 X10(3)/MCL (ref 4.5–11.5)
WBC #/AREA URNS AUTO: ABNORMAL /HPF

## 2024-01-15 PROCEDURE — 25000003 PHARM REV CODE 250: Performed by: NURSE PRACTITIONER

## 2024-01-15 PROCEDURE — 81003 URINALYSIS AUTO W/O SCOPE: CPT | Performed by: NURSE PRACTITIONER

## 2024-01-15 PROCEDURE — 85025 COMPLETE CBC W/AUTO DIFF WBC: CPT | Performed by: NURSE PRACTITIONER

## 2024-01-15 PROCEDURE — 96374 THER/PROPH/DIAG INJ IV PUSH: CPT

## 2024-01-15 PROCEDURE — 99284 EMERGENCY DEPT VISIT MOD MDM: CPT | Mod: 25

## 2024-01-15 PROCEDURE — 83735 ASSAY OF MAGNESIUM: CPT | Performed by: NURSE PRACTITIONER

## 2024-01-15 PROCEDURE — 80053 COMPREHEN METABOLIC PANEL: CPT | Performed by: NURSE PRACTITIONER

## 2024-01-15 PROCEDURE — P9612 CATHETERIZE FOR URINE SPEC: HCPCS

## 2024-01-15 RX ORDER — FERROUS SULFATE 325(65) MG
325 TABLET, DELAYED RELEASE (ENTERIC COATED) ORAL DAILY
Qty: 30 TABLET | Refills: 0 | Status: SHIPPED | OUTPATIENT
Start: 2024-01-15 | End: 2024-02-14

## 2024-01-15 RX ORDER — MAGNESIUM SULFATE HEPTAHYDRATE 40 MG/ML
2 INJECTION, SOLUTION INTRAVENOUS
Status: DISCONTINUED | OUTPATIENT
Start: 2024-01-15 | End: 2024-01-15 | Stop reason: HOSPADM

## 2024-01-15 RX ORDER — FERROUS SULFATE 325(65) MG
325 TABLET, DELAYED RELEASE (ENTERIC COATED) ORAL DAILY
Qty: 30 TABLET | Refills: 0 | Status: SHIPPED | OUTPATIENT
Start: 2024-01-15 | End: 2024-01-15

## 2024-01-15 RX ORDER — NITROFURANTOIN 25; 75 MG/1; MG/1
100 CAPSULE ORAL 2 TIMES DAILY
Qty: 10 CAPSULE | Refills: 0 | Status: SHIPPED | OUTPATIENT
Start: 2024-01-15 | End: 2024-01-20

## 2024-01-15 RX ORDER — NITROFURANTOIN 25; 75 MG/1; MG/1
100 CAPSULE ORAL 2 TIMES DAILY
Qty: 10 CAPSULE | Refills: 0 | Status: SHIPPED | OUTPATIENT
Start: 2024-01-15 | End: 2024-01-15

## 2024-01-15 RX ORDER — CLONAZEPAM 0.5 MG/1
0.5 TABLET ORAL
Status: COMPLETED | OUTPATIENT
Start: 2024-01-15 | End: 2024-01-15

## 2024-01-15 RX ORDER — NITROFURANTOIN 25; 75 MG/1; MG/1
100 CAPSULE ORAL
Status: COMPLETED | OUTPATIENT
Start: 2024-01-15 | End: 2024-01-15

## 2024-01-15 RX ORDER — SODIUM CHLORIDE 9 MG/ML
1000 INJECTION, SOLUTION INTRAVENOUS
Status: DISCONTINUED | OUTPATIENT
Start: 2024-01-15 | End: 2024-01-15 | Stop reason: HOSPADM

## 2024-01-15 RX ADMIN — CLONAZEPAM 0.5 MG: 0.5 TABLET ORAL at 02:01

## 2024-01-15 RX ADMIN — NITROFURANTOIN MONOHYDRATE/MACROCRYSTALS 100 MG: 75; 25 CAPSULE ORAL at 04:01

## 2024-01-15 NOTE — DISCHARGE INSTRUCTIONS
Take iron daily to help with mild anemia. Macrobid for urine infection. Hydrate.     Please help with feeding and drinking as the patient is having worsening tremors and harder time doing this for herself.     Sent referral to neurology for management of her parkinson's disease since symptoms are progressing.

## 2024-01-15 NOTE — ED PROVIDER NOTES
Encounter Date: 1/15/2024       History     Chief Complaint   Patient presents with    Fatigue     Sent from [facility] for generalized weakness starting this morning. AAOx4, no speech deficit, no facial droop. Hx Parkinson's.     See MDM    The history is provided by the patient. No  was used.     Review of patient's allergies indicates:   Allergen Reactions    Amitriptyline     Aspirin      Other reaction(s): cannot take d/t previous interferon treatment    Dextromethorphan     Diphenhydramine hcl     Hydroxyzine      Past Medical History:   Diagnosis Date    Anemia     Bipolar depression     Diabetes mellitus     Essential (primary) hypertension     Hepatitis C virus infection 06/06/2022    Quant neg 6/18    High cholesterol     Hypothyroidism, unspecified     Hypothyroidism, unspecified     Migraines     Neuroleptic induced parkinsonism     Neuroleptic-induced tardive dyskinesia     Neuropathy     Parkinson's disease     Restless leg syndrome     Schizoaffective disorder     Tremor     Type 2 diabetes mellitus with unspecified diabetic retinopathy without macular edema     Unspecified urinary incontinence      Past Surgical History:   Procedure Laterality Date    TONSILLECTOMY       History reviewed. No pertinent family history.  Social History     Tobacco Use    Smoking status: Former    Smokeless tobacco: Never   Substance Use Topics    Alcohol use: Never    Drug use: Never     Review of Systems   Constitutional:  Positive for fatigue.        Parkinson's symptoms worsening   All other systems reviewed and are negative.      Physical Exam     Initial Vitals [01/15/24 1314]   BP Pulse Resp Temp SpO2   125/72 68 16 97.3 °F (36.3 °C) 98 %      MAP       --         Physical Exam    Nursing note and vitals reviewed.  Constitutional: She appears well-developed.   Cardiovascular:  Normal rate, regular rhythm and normal heart sounds.           Pulmonary/Chest: Breath sounds normal. No  respiratory distress.   Abdominal: Abdomen is soft. She exhibits no distension. There is no abdominal tenderness.   Musculoskeletal:         General: Normal range of motion.     Neurological: She is alert and oriented to person, place, and time. She has normal strength.   Mild tremor to UE and LE.     She is able to squeeze my hands and push/pull against me. She can lift her legs off bed 4/5 strength bilateral lower.    Skin: Skin is warm and dry.   Psychiatric: She has a normal mood and affect.         ED Course   Procedures  Labs Reviewed   CBC WITH DIFFERENTIAL - Abnormal; Notable for the following components:       Result Value    WBC 3.72 (*)     RBC 3.85 (*)     Hgb 9.0 (*)     Hct 30.2 (*)     MCV 78.4 (*)     MCH 23.4 (*)     MCHC 29.8 (*)     RDW 17.5 (*)     Lymph # 0.59 (*)     All other components within normal limits   COMPREHENSIVE METABOLIC PANEL - Abnormal; Notable for the following components:    Carbon Dioxide 21 (*)     Blood Urea Nitrogen 29.0 (*)     Alkaline Phosphatase 38 (*)     All other components within normal limits   MAGNESIUM - Abnormal; Notable for the following components:    Magnesium Level 1.40 (*)     All other components within normal limits   URINALYSIS, REFLEX TO URINE CULTURE - Abnormal; Notable for the following components:    Leukocyte Esterase, UA Trace (*)     All other components within normal limits   URINALYSIS, MICROSCOPIC - Abnormal; Notable for the following components:    Bacteria, UA Moderate (*)     All other components within normal limits          Imaging Results    None          Medications   0.9%  NaCl infusion ( Intravenous Restarted 1/15/24 1429)   magnesium sulfate 2g in water 50mL IVPB (premix) ( Intravenous Restarted 1/15/24 1429)   clonazePAM tablet 0.5 mg (0.5 mg Oral Given 1/15/24 1453)   nitrofurantoin (macrocrystal-monohydrate) 100 MG capsule 100 mg (100 mg Oral Given 1/15/24 1614)     Medical Decision Making  72 y/o female who presents from nursing  home for concern for worsening tremors, having a hard time feeding and drinking by herself, left knee/lower leg pain. No falls/trauma recently. States that her symptoms have been worsening for about a month now. She hasn't seen neurologist. She feels she isn't being taken care of very well and she just doesn't feel great so wanted to be checked out. No cough/congestion. No n/v. Only pain is left lower leg.     Exam reveals anemia, mild UTI, mild dehydration. Left lower leg has no swelling, redness, lesions. Good pulse. She can raise it in bed. Gave liter of fluids and replenished magnesium. Gave first dose of macrobid here and will discharge with macrobid and iron supplement. Will send referral to neurology for better management of her parkinson's. Did give clonazepam here as she felt anxious. Vitals stable. Will discharge back.     Amount and/or Complexity of Data Reviewed  Labs: ordered. Decision-making details documented in ED Course.    Risk  Prescription drug management.      Additional MDM:   Differential Diagnosis:   Other: The following diagnoses were also considered and will be evaluated: UTI, dehydration and anemia.                                   Clinical Impression:  Final diagnoses:  [G20.B2] Parkinson's disease with dyskinesia and fluctuating manifestations (Primary)  [N30.00] Acute cystitis without hematuria  [D64.9] Anemia, unspecified type          ED Disposition Condition    Discharge Stable          ED Prescriptions       Medication Sig Dispense Start Date End Date Auth. Provider    nitrofurantoin, macrocrystal-monohydrate, (MACROBID) 100 MG capsule  (Status: Discontinued) Take 1 capsule (100 mg total) by mouth 2 (two) times daily. for 5 days 10 capsule 1/15/2024 1/15/2024 Violetta Pretty FNP    ferrous sulfate 325 (65 FE) MG EC tablet  (Status: Discontinued) Take 1 tablet (325 mg total) by mouth once daily. 30 tablet 1/15/2024 1/15/2024 Violetta Pretty FNP    ferrous sulfate 325 (65 FE) MG EC  tablet Take 1 tablet (325 mg total) by mouth once daily. 30 tablet 1/15/2024 2/14/2024 Violetta Pretty FNP    nitrofurantoin, macrocrystal-monohydrate, (MACROBID) 100 MG capsule Take 1 capsule (100 mg total) by mouth 2 (two) times daily. for 5 days 10 capsule 1/15/2024 1/20/2024 Violetta Pretty FNP          Follow-up Information       Follow up With Specialties Details Why Contact Info    primary care provider  Call in 1 week As needed              Violetta Pretty FNP  01/15/24 9280

## 2024-04-02 DIAGNOSIS — F03.90 DEMENTIA, SENILE: Primary | ICD-10-CM

## 2024-04-05 ENCOUNTER — HOSPITAL ENCOUNTER (OUTPATIENT)
Dept: RADIOLOGY | Facility: HOSPITAL | Age: 74
Discharge: HOME | End: 2024-04-05
Attending: INTERNAL MEDICINE
Payer: MEDICARE

## 2024-04-05 DIAGNOSIS — F03.90 DEMENTIA, SENILE: ICD-10-CM

## 2024-04-05 PROCEDURE — 70450 CT HEAD/BRAIN W/O DYE: CPT | Mod: TC
